# Patient Record
Sex: FEMALE | Race: OTHER | HISPANIC OR LATINO | URBAN - METROPOLITAN AREA
[De-identification: names, ages, dates, MRNs, and addresses within clinical notes are randomized per-mention and may not be internally consistent; named-entity substitution may affect disease eponyms.]

---

## 2017-10-01 ENCOUNTER — EMERGENCY (EMERGENCY)
Facility: HOSPITAL | Age: 77
LOS: 0 days | Discharge: HOME | End: 2017-10-01
Attending: EMERGENCY MEDICINE
Payer: MEDICARE

## 2017-10-01 VITALS
TEMPERATURE: 99 F | HEART RATE: 68 BPM | WEIGHT: 190.04 LBS | RESPIRATION RATE: 16 BRPM | DIASTOLIC BLOOD PRESSURE: 65 MMHG | SYSTOLIC BLOOD PRESSURE: 176 MMHG | HEIGHT: 65 IN | OXYGEN SATURATION: 96 %

## 2017-10-01 DIAGNOSIS — Z90.49 ACQUIRED ABSENCE OF OTHER SPECIFIED PARTS OF DIGESTIVE TRACT: Chronic | ICD-10-CM

## 2017-10-01 DIAGNOSIS — Z98.891 HISTORY OF UTERINE SCAR FROM PREVIOUS SURGERY: Chronic | ICD-10-CM

## 2017-10-01 DIAGNOSIS — E89.0 POSTPROCEDURAL HYPOTHYROIDISM: Chronic | ICD-10-CM

## 2017-10-01 LAB
APPEARANCE UR: ABNORMAL
BILIRUB UR-MCNC: NEGATIVE — SIGNIFICANT CHANGE UP
COLOR SPEC: YELLOW — SIGNIFICANT CHANGE UP
DIFF PNL FLD: ABNORMAL
GLUCOSE UR QL: NEGATIVE MG/DL — SIGNIFICANT CHANGE UP
KETONES UR-MCNC: NEGATIVE — SIGNIFICANT CHANGE UP
LEUKOCYTE ESTERASE UR-ACNC: ABNORMAL
NITRITE UR-MCNC: NEGATIVE — SIGNIFICANT CHANGE UP
PH UR: 8 — SIGNIFICANT CHANGE UP (ref 5–8)
PROT UR-MCNC: 30 MG/DL
SP GR SPEC: 1.01 — SIGNIFICANT CHANGE UP (ref 1.01–1.02)
UROBILINOGEN FLD QL: NEGATIVE MG/DL — SIGNIFICANT CHANGE UP

## 2017-10-01 PROCEDURE — 70450 CT HEAD/BRAIN W/O DYE: CPT | Mod: 26

## 2017-10-01 PROCEDURE — 99284 EMERGENCY DEPT VISIT MOD MDM: CPT

## 2017-10-01 RX ORDER — AZTREONAM 2 G
1 VIAL (EA) INJECTION
Qty: 6 | Refills: 0 | OUTPATIENT
Start: 2017-10-01 | End: 2017-10-04

## 2017-10-01 RX ADMIN — Medication 1 TABLET(S): at 23:37

## 2017-10-01 NOTE — ED ADULT NURSE NOTE - PMH
CKD (chronic kidney disease)    Dementia    DM (diabetes mellitus)    HLD (hyperlipidemia)    HTN (hypertension)

## 2017-10-01 NOTE — ED PROVIDER NOTE - PHYSICAL EXAMINATION
Gen: Alert, NAD  Head: NC, AT, PERRL, EOMI, normal lids/conjunctiva  ENT:  normal hearing, patent oropharynx without erythema/exudate, uvula midline  Neck: +supple, no tenderness/meningismus/JVD, +Trachea midline  Chest: no chest wall tenderness, equal chest rise  Pulm: Bilateral BS, normal resp effort, no wheeze/stridor/retractions  CV: RRR, no M/R/G, +dist pulses  Abd: soft, NT/ND, +BS, no hepatosplenomegaly  Rectal: deferred  Mskel: no edema/erythema/cyanosis  Skin: no rash  Neuro: AAOx2, no sensory/motor deficits, CN 2-12 intact

## 2017-10-01 NOTE — ED PROVIDER NOTE - PROGRESS NOTE DETAILS
Signed pt out to Dr. Lewis. Dispo pending CT head, if no acute findings - d/c home. Results reported to patient--UTI, CT negative  Pt. reports feeling better after meds  pt. agrees to f/u with primary care outpt.  pt. understands to return to ED if symptoms worsen; will d/c

## 2017-10-01 NOTE — ED ADULT NURSE NOTE - OBJECTIVE STATEMENT
Pt presented to the ed s/p fall. as per daughter pt hit the back of the head on the chair.  Denies any pain

## 2017-10-01 NOTE — ED PROVIDER NOTE - OBJECTIVE STATEMENT
Pertinent PMH/PSH/FHx/SHx and Review of Systems contained within:  76yo F pmh inclusive of IDDM, stage 4 CKD, htn, dementia, brought in for eval after fall. Pertinent PMH/PSH/FHx/SHx and Review of Systems contained within:  78yo F pmh inclusive of IDDM, stage 4 CKD, htn, dementia, brought in for eval after fall. Pt was at store with her daughter, tried sitting back onto walker/chair, brakes were not on, pt fell onto buttocks, back of head struck seat on her walker then daughter lowered pt back onto ground. Unk if LOC or if pt was "just closing her eyes for a few seconds". Per daughter, pt is back to her baseline mental status now. On ROS, does have foul smelling urine but denies dysuria or abd discomfort. No fever/chills, No photophobia/eye pain/changes in vision, No ear pain/sore throat/dysphagia, No chest pain/palpitations, no SOB/cough/wheeze/stridor, No abdominal pain, No N/V/D, no dysuria/frequency/discharge, No neck/back pain, no rash, no focal neuro symptoms.

## 2017-10-01 NOTE — ED PROVIDER NOTE - MEDICAL DECISION MAKING DETAILS
76yo F pmh as above, here for fall. H&P most consistent w/ mechanical fall. FS 140s. No UTI by UA. Given age and mechanism, CT to r/o bleed. If negative, dc home into daughter's care.

## 2017-10-02 RX ORDER — AZTREONAM 2 G
1 VIAL (EA) INJECTION
Qty: 6 | Refills: 0 | OUTPATIENT
Start: 2017-10-02 | End: 2017-10-05

## 2017-10-03 DIAGNOSIS — E78.4 OTHER HYPERLIPIDEMIA: ICD-10-CM

## 2017-10-03 DIAGNOSIS — N18.9 CHRONIC KIDNEY DISEASE, UNSPECIFIED: ICD-10-CM

## 2017-10-03 DIAGNOSIS — S09.90XA UNSPECIFIED INJURY OF HEAD, INITIAL ENCOUNTER: ICD-10-CM
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
## 2017-10-03 DIAGNOSIS — I10 ESSENTIAL (PRIMARY) HYPERTENSION: ICD-10-CM

## 2017-10-03 DIAGNOSIS — E11.9 TYPE 2 DIABETES MELLITUS WITHOUT COMPLICATIONS: ICD-10-CM

## 2017-10-03 DIAGNOSIS — E89.0 POSTPROCEDURAL HYPOTHYROIDISM: ICD-10-CM
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
## 2018-04-25 ENCOUNTER — NON-APPOINTMENT (OUTPATIENT)
Age: 78
End: 2018-04-25

## 2018-04-25 ENCOUNTER — APPOINTMENT (OUTPATIENT)
Dept: CARDIOLOGY | Facility: CLINIC | Age: 78
End: 2018-04-25
Payer: MEDICARE

## 2018-04-25 VITALS
WEIGHT: 203 LBS | HEIGHT: 59 IN | BODY MASS INDEX: 40.92 KG/M2 | OXYGEN SATURATION: 96 % | SYSTOLIC BLOOD PRESSURE: 167 MMHG | HEART RATE: 66 BPM | DIASTOLIC BLOOD PRESSURE: 63 MMHG

## 2018-04-25 DIAGNOSIS — I10 ESSENTIAL (PRIMARY) HYPERTENSION: ICD-10-CM

## 2018-04-25 PROCEDURE — 93000 ELECTROCARDIOGRAM COMPLETE: CPT

## 2018-04-25 PROCEDURE — 99205 OFFICE O/P NEW HI 60 MIN: CPT

## 2018-04-25 RX ORDER — LEVOTHYROXINE SODIUM 0.03 MG/1
25 TABLET ORAL DAILY
Qty: 90 | Refills: 3 | Status: ACTIVE | COMMUNITY
Start: 2018-02-27

## 2018-04-25 RX ORDER — MEMANTINE HYDROCHLORIDE AND DONEPEZIL HYDROCHLORIDE 28; 10 MG/1; MG/1
28-10 CAPSULE ORAL
Qty: 90 | Refills: 0 | Status: ACTIVE | COMMUNITY
Start: 2017-11-16

## 2018-04-25 RX ORDER — CARVEDILOL 12.5 MG/1
12.5 TABLET, FILM COATED ORAL TWICE DAILY
Qty: 30 | Refills: 0 | Status: ACTIVE | COMMUNITY
Start: 2017-11-14

## 2018-04-25 RX ORDER — AMLODIPINE BESYLATE 10 MG/1
10 TABLET ORAL
Qty: 30 | Refills: 3 | Status: ACTIVE | COMMUNITY

## 2018-04-25 RX ORDER — CITALOPRAM HYDROBROMIDE 20 MG/1
20 TABLET, FILM COATED ORAL
Refills: 0 | Status: ACTIVE | COMMUNITY
Start: 2017-11-22

## 2018-05-01 ENCOUNTER — APPOINTMENT (OUTPATIENT)
Dept: CV DIAGNOSITCS | Facility: HOSPITAL | Age: 78
End: 2018-05-01

## 2018-05-01 ENCOUNTER — OUTPATIENT (OUTPATIENT)
Dept: OUTPATIENT SERVICES | Facility: HOSPITAL | Age: 78
LOS: 1 days | End: 2018-05-01
Payer: COMMERCIAL

## 2018-05-01 DIAGNOSIS — I10 ESSENTIAL (PRIMARY) HYPERTENSION: ICD-10-CM

## 2018-05-01 DIAGNOSIS — E89.0 POSTPROCEDURAL HYPOTHYROIDISM: Chronic | ICD-10-CM

## 2018-05-01 DIAGNOSIS — Z90.49 ACQUIRED ABSENCE OF OTHER SPECIFIED PARTS OF DIGESTIVE TRACT: Chronic | ICD-10-CM

## 2018-05-01 DIAGNOSIS — Z98.891 HISTORY OF UTERINE SCAR FROM PREVIOUS SURGERY: Chronic | ICD-10-CM

## 2018-05-01 PROCEDURE — 93306 TTE W/DOPPLER COMPLETE: CPT

## 2018-05-01 PROCEDURE — 93306 TTE W/DOPPLER COMPLETE: CPT | Mod: 26

## 2018-05-15 ENCOUNTER — APPOINTMENT (OUTPATIENT)
Dept: CARDIOLOGY | Facility: CLINIC | Age: 78
End: 2018-05-15

## 2018-07-13 ENCOUNTER — INPATIENT (INPATIENT)
Facility: HOSPITAL | Age: 78
LOS: 5 days | Discharge: HOME | DRG: 247 | End: 2018-07-19
Attending: HOSPITALIST | Admitting: INTERNAL MEDICINE
Payer: MEDICARE

## 2018-07-13 VITALS
HEIGHT: 61 IN | DIASTOLIC BLOOD PRESSURE: 69 MMHG | WEIGHT: 199.96 LBS | TEMPERATURE: 98 F | OXYGEN SATURATION: 98 % | RESPIRATION RATE: 16 BRPM | HEART RATE: 57 BPM | SYSTOLIC BLOOD PRESSURE: 162 MMHG

## 2018-07-13 DIAGNOSIS — Z98.890 OTHER SPECIFIED POSTPROCEDURAL STATES: Chronic | ICD-10-CM

## 2018-07-13 DIAGNOSIS — I25.10 ATHEROSCLEROTIC HEART DISEASE OF NATIVE CORONARY ARTERY WITHOUT ANGINA PECTORIS: ICD-10-CM

## 2018-07-13 DIAGNOSIS — Z90.49 ACQUIRED ABSENCE OF OTHER SPECIFIED PARTS OF DIGESTIVE TRACT: Chronic | ICD-10-CM

## 2018-07-13 DIAGNOSIS — I10 ESSENTIAL (PRIMARY) HYPERTENSION: ICD-10-CM

## 2018-07-13 DIAGNOSIS — D64.9 ANEMIA, UNSPECIFIED: ICD-10-CM

## 2018-07-13 DIAGNOSIS — N18.4 CHRONIC KIDNEY DISEASE, STAGE 4 (SEVERE): ICD-10-CM

## 2018-07-13 DIAGNOSIS — Z98.891 HISTORY OF UTERINE SCAR FROM PREVIOUS SURGERY: Chronic | ICD-10-CM

## 2018-07-13 DIAGNOSIS — E89.0 POSTPROCEDURAL HYPOTHYROIDISM: Chronic | ICD-10-CM

## 2018-07-13 LAB
ALBUMIN SERPL ELPH-MCNC: 4.1 G/DL — SIGNIFICANT CHANGE UP (ref 3.3–5)
ALP SERPL-CCNC: 65 U/L — SIGNIFICANT CHANGE UP (ref 40–120)
ALT FLD-CCNC: 9 U/L — LOW (ref 10–45)
ANION GAP SERPL CALC-SCNC: 14 MMOL/L — SIGNIFICANT CHANGE UP (ref 5–17)
APPEARANCE UR: ABNORMAL
AST SERPL-CCNC: 16 U/L — SIGNIFICANT CHANGE UP (ref 10–40)
BACTERIA # UR AUTO: ABNORMAL /HPF
BILIRUB SERPL-MCNC: 0.2 MG/DL — SIGNIFICANT CHANGE UP (ref 0.2–1.2)
BILIRUB UR-MCNC: NEGATIVE — SIGNIFICANT CHANGE UP
BUN SERPL-MCNC: 39 MG/DL — HIGH (ref 7–23)
C DIFF GDH STL QL: NEGATIVE — SIGNIFICANT CHANGE UP
C DIFF GDH STL QL: SIGNIFICANT CHANGE UP
CALCIUM SERPL-MCNC: 8.9 MG/DL — SIGNIFICANT CHANGE UP (ref 8.4–10.5)
CHLORIDE SERPL-SCNC: 107 MMOL/L — SIGNIFICANT CHANGE UP (ref 96–108)
CO2 SERPL-SCNC: 24 MMOL/L — SIGNIFICANT CHANGE UP (ref 22–31)
COLOR SPEC: ABNORMAL
CREAT SERPL-MCNC: 1.97 MG/DL — HIGH (ref 0.5–1.3)
DIFF PNL FLD: ABNORMAL
EPI CELLS # UR: SIGNIFICANT CHANGE UP /HPF
GLUCOSE BLDC GLUCOMTR-MCNC: 104 MG/DL — HIGH (ref 70–99)
GLUCOSE BLDC GLUCOMTR-MCNC: 136 MG/DL — HIGH (ref 70–99)
GLUCOSE BLDC GLUCOMTR-MCNC: 181 MG/DL — HIGH (ref 70–99)
GLUCOSE SERPL-MCNC: 100 MG/DL — HIGH (ref 70–99)
GLUCOSE UR QL: NEGATIVE — SIGNIFICANT CHANGE UP
HCT VFR BLD CALC: 34.1 % — LOW (ref 34.5–45)
HGB BLD-MCNC: 10.9 G/DL — LOW (ref 11.5–15.5)
KETONES UR-MCNC: NEGATIVE — SIGNIFICANT CHANGE UP
LEUKOCYTE ESTERASE UR-ACNC: ABNORMAL
MCHC RBC-ENTMCNC: 30.4 PG — SIGNIFICANT CHANGE UP (ref 27–34)
MCHC RBC-ENTMCNC: 32.1 GM/DL — SIGNIFICANT CHANGE UP (ref 32–36)
MCV RBC AUTO: 94.7 FL — SIGNIFICANT CHANGE UP (ref 80–100)
NITRITE UR-MCNC: NEGATIVE — SIGNIFICANT CHANGE UP
PH UR: 8 — SIGNIFICANT CHANGE UP (ref 5–8)
PLATELET # BLD AUTO: 272 K/UL — SIGNIFICANT CHANGE UP (ref 150–400)
POTASSIUM SERPL-MCNC: 4.4 MMOL/L — SIGNIFICANT CHANGE UP (ref 3.5–5.3)
POTASSIUM SERPL-SCNC: 4.4 MMOL/L — SIGNIFICANT CHANGE UP (ref 3.5–5.3)
PROT SERPL-MCNC: 7.9 G/DL — SIGNIFICANT CHANGE UP (ref 6–8.3)
PROT UR-MCNC: 300 MG/DL
RBC # BLD: 3.6 M/UL — LOW (ref 3.8–5.2)
RBC # FLD: 13.9 % — SIGNIFICANT CHANGE UP (ref 10.3–14.5)
RBC CASTS # UR COMP ASSIST: >50 /HPF (ref 0–2)
SODIUM SERPL-SCNC: 145 MMOL/L — SIGNIFICANT CHANGE UP (ref 135–145)
SP GR SPEC: 1.02 — SIGNIFICANT CHANGE UP (ref 1.01–1.02)
TRI-PHOS CRY UR QL COMP ASSIST: ABNORMAL
UROBILINOGEN FLD QL: NEGATIVE — SIGNIFICANT CHANGE UP
WBC # BLD: 10 K/UL — SIGNIFICANT CHANGE UP (ref 3.8–10.5)
WBC # FLD AUTO: 10 K/UL — SIGNIFICANT CHANGE UP (ref 3.8–10.5)
WBC UR QL: ABNORMAL /HPF (ref 0–5)

## 2018-07-13 PROCEDURE — 99152 MOD SED SAME PHYS/QHP 5/>YRS: CPT

## 2018-07-13 PROCEDURE — 76937 US GUIDE VASCULAR ACCESS: CPT | Mod: 26

## 2018-07-13 PROCEDURE — 93458 L HRT ARTERY/VENTRICLE ANGIO: CPT | Mod: 26,XU

## 2018-07-13 PROCEDURE — 92928 PRQ TCAT PLMT NTRAC ST 1 LES: CPT | Mod: RC

## 2018-07-13 PROCEDURE — 93010 ELECTROCARDIOGRAM REPORT: CPT | Mod: 76

## 2018-07-13 PROCEDURE — 99223 1ST HOSP IP/OBS HIGH 75: CPT

## 2018-07-13 RX ORDER — ACETYLCYSTEINE 200 MG/ML
1200 VIAL (ML) MISCELLANEOUS EVERY 12 HOURS
Qty: 0 | Refills: 0 | Status: COMPLETED | OUTPATIENT
Start: 2018-07-13 | End: 2018-07-15

## 2018-07-13 RX ORDER — HYDRALAZINE HCL 50 MG
50 TABLET ORAL
Qty: 0 | Refills: 0 | Status: DISCONTINUED | OUTPATIENT
Start: 2018-07-13 | End: 2018-07-19

## 2018-07-13 RX ORDER — INSULIN LISPRO 100/ML
VIAL (ML) SUBCUTANEOUS
Qty: 0 | Refills: 0 | Status: DISCONTINUED | OUTPATIENT
Start: 2018-07-13 | End: 2018-07-19

## 2018-07-13 RX ORDER — ASPIRIN/CALCIUM CARB/MAGNESIUM 324 MG
1 TABLET ORAL
Qty: 0 | Refills: 0 | COMMUNITY

## 2018-07-13 RX ORDER — HEPARIN SODIUM 5000 [USP'U]/ML
5000 INJECTION INTRAVENOUS; SUBCUTANEOUS EVERY 8 HOURS
Qty: 0 | Refills: 0 | Status: DISCONTINUED | OUTPATIENT
Start: 2018-07-13 | End: 2018-07-13

## 2018-07-13 RX ORDER — HYDRALAZINE HCL 50 MG
1 TABLET ORAL
Qty: 0 | Refills: 0 | COMMUNITY

## 2018-07-13 RX ORDER — SODIUM CHLORIDE 9 MG/ML
1000 INJECTION INTRAMUSCULAR; INTRAVENOUS; SUBCUTANEOUS
Qty: 0 | Refills: 0 | Status: DISCONTINUED | OUTPATIENT
Start: 2018-07-13 | End: 2018-07-14

## 2018-07-13 RX ORDER — MEMANTINE HYDROCHLORIDE AND DONEPEZIL HYDROCHLORIDE 21; 10 MG/1; MG/1
1 CAPSULE ORAL
Qty: 0 | Refills: 0 | COMMUNITY

## 2018-07-13 RX ORDER — INSULIN GLARGINE 100 [IU]/ML
13 INJECTION, SOLUTION SUBCUTANEOUS
Qty: 0 | Refills: 0 | Status: DISCONTINUED | OUTPATIENT
Start: 2018-07-13 | End: 2018-07-19

## 2018-07-13 RX ORDER — DEXTROSE 50 % IN WATER 50 %
15 SYRINGE (ML) INTRAVENOUS ONCE
Qty: 0 | Refills: 0 | Status: DISCONTINUED | OUTPATIENT
Start: 2018-07-13 | End: 2018-07-19

## 2018-07-13 RX ORDER — INSULIN DETEMIR 100/ML (3)
16 INSULIN PEN (ML) SUBCUTANEOUS
Qty: 0 | Refills: 0 | COMMUNITY

## 2018-07-13 RX ORDER — DEXTROSE 50 % IN WATER 50 %
25 SYRINGE (ML) INTRAVENOUS ONCE
Qty: 0 | Refills: 0 | Status: DISCONTINUED | OUTPATIENT
Start: 2018-07-13 | End: 2018-07-19

## 2018-07-13 RX ORDER — CITALOPRAM 10 MG/1
20 TABLET, FILM COATED ORAL DAILY
Qty: 0 | Refills: 0 | Status: DISCONTINUED | OUTPATIENT
Start: 2018-07-13 | End: 2018-07-19

## 2018-07-13 RX ORDER — DONEPEZIL HYDROCHLORIDE 10 MG/1
10 TABLET, FILM COATED ORAL AT BEDTIME
Qty: 0 | Refills: 0 | Status: DISCONTINUED | OUTPATIENT
Start: 2018-07-13 | End: 2018-07-19

## 2018-07-13 RX ORDER — DEXTROSE 50 % IN WATER 50 %
12.5 SYRINGE (ML) INTRAVENOUS ONCE
Qty: 0 | Refills: 0 | Status: DISCONTINUED | OUTPATIENT
Start: 2018-07-13 | End: 2018-07-19

## 2018-07-13 RX ORDER — HYDRALAZINE HCL 50 MG
25 TABLET ORAL
Qty: 0 | Refills: 0 | Status: DISCONTINUED | OUTPATIENT
Start: 2018-07-13 | End: 2018-07-19

## 2018-07-13 RX ORDER — LEVOTHYROXINE SODIUM 125 MCG
25 TABLET ORAL DAILY
Qty: 0 | Refills: 0 | Status: DISCONTINUED | OUTPATIENT
Start: 2018-07-13 | End: 2018-07-19

## 2018-07-13 RX ORDER — INSULIN GLARGINE 100 [IU]/ML
12.8 INJECTION, SOLUTION SUBCUTANEOUS AT BEDTIME
Qty: 0 | Refills: 0 | Status: DISCONTINUED | OUTPATIENT
Start: 2018-07-13 | End: 2018-07-13

## 2018-07-13 RX ORDER — CITALOPRAM 10 MG/1
1 TABLET, FILM COATED ORAL
Qty: 0 | Refills: 0 | COMMUNITY

## 2018-07-13 RX ORDER — CLOPIDOGREL BISULFATE 75 MG/1
75 TABLET, FILM COATED ORAL DAILY
Qty: 0 | Refills: 0 | Status: DISCONTINUED | OUTPATIENT
Start: 2018-07-13 | End: 2018-07-19

## 2018-07-13 RX ORDER — INSULIN LISPRO 100/ML
VIAL (ML) SUBCUTANEOUS AT BEDTIME
Qty: 0 | Refills: 0 | Status: DISCONTINUED | OUTPATIENT
Start: 2018-07-13 | End: 2018-07-19

## 2018-07-13 RX ORDER — CARVEDILOL PHOSPHATE 80 MG/1
1 CAPSULE, EXTENDED RELEASE ORAL
Qty: 0 | Refills: 0 | COMMUNITY

## 2018-07-13 RX ORDER — ATORVASTATIN CALCIUM 80 MG/1
40 TABLET, FILM COATED ORAL AT BEDTIME
Qty: 0 | Refills: 0 | Status: DISCONTINUED | OUTPATIENT
Start: 2018-07-13 | End: 2018-07-19

## 2018-07-13 RX ORDER — MEMANTINE HYDROCHLORIDE 10 MG/1
28 TABLET ORAL DAILY
Qty: 0 | Refills: 0 | Status: DISCONTINUED | OUTPATIENT
Start: 2018-07-13 | End: 2018-07-13

## 2018-07-13 RX ORDER — INSULIN GLARGINE 100 [IU]/ML
13 INJECTION, SOLUTION SUBCUTANEOUS AT BEDTIME
Qty: 0 | Refills: 0 | Status: DISCONTINUED | OUTPATIENT
Start: 2018-07-13 | End: 2018-07-13

## 2018-07-13 RX ORDER — GLUCAGON INJECTION, SOLUTION 0.5 MG/.1ML
1 INJECTION, SOLUTION SUBCUTANEOUS ONCE
Qty: 0 | Refills: 0 | Status: DISCONTINUED | OUTPATIENT
Start: 2018-07-13 | End: 2018-07-19

## 2018-07-13 RX ORDER — LEVOTHYROXINE SODIUM 125 MCG
1 TABLET ORAL
Qty: 0 | Refills: 0 | COMMUNITY

## 2018-07-13 RX ORDER — AMLODIPINE BESYLATE 2.5 MG/1
10 TABLET ORAL DAILY
Qty: 0 | Refills: 0 | Status: DISCONTINUED | OUTPATIENT
Start: 2018-07-13 | End: 2018-07-19

## 2018-07-13 RX ORDER — ASPIRIN/CALCIUM CARB/MAGNESIUM 324 MG
81 TABLET ORAL DAILY
Qty: 0 | Refills: 0 | Status: DISCONTINUED | OUTPATIENT
Start: 2018-07-13 | End: 2018-07-19

## 2018-07-13 RX ORDER — CARVEDILOL PHOSPHATE 80 MG/1
12.5 CAPSULE, EXTENDED RELEASE ORAL EVERY 12 HOURS
Qty: 0 | Refills: 0 | Status: DISCONTINUED | OUTPATIENT
Start: 2018-07-13 | End: 2018-07-19

## 2018-07-13 RX ORDER — FENOFIBRATE,MICRONIZED 130 MG
48 CAPSULE ORAL AT BEDTIME
Qty: 0 | Refills: 0 | Status: DISCONTINUED | OUTPATIENT
Start: 2018-07-13 | End: 2018-07-19

## 2018-07-13 RX ORDER — MEMANTINE HYDROCHLORIDE 10 MG/1
10 TABLET ORAL
Qty: 0 | Refills: 0 | Status: DISCONTINUED | OUTPATIENT
Start: 2018-07-13 | End: 2018-07-19

## 2018-07-13 RX ORDER — AMLODIPINE BESYLATE 2.5 MG/1
1 TABLET ORAL
Qty: 0 | Refills: 0 | COMMUNITY

## 2018-07-13 RX ORDER — SODIUM CHLORIDE 9 MG/ML
1000 INJECTION, SOLUTION INTRAVENOUS
Qty: 0 | Refills: 0 | Status: DISCONTINUED | OUTPATIENT
Start: 2018-07-13 | End: 2018-07-19

## 2018-07-13 RX ORDER — FENOFIBRATE,MICRONIZED 130 MG
1 CAPSULE ORAL
Qty: 0 | Refills: 0 | COMMUNITY

## 2018-07-13 RX ADMIN — MEMANTINE HYDROCHLORIDE 10 MILLIGRAM(S): 10 TABLET ORAL at 22:41

## 2018-07-13 RX ADMIN — Medication 25 MILLIGRAM(S): at 17:59

## 2018-07-13 RX ADMIN — Medication 2: at 18:18

## 2018-07-13 RX ADMIN — INSULIN GLARGINE 13 UNIT(S): 100 INJECTION, SOLUTION SUBCUTANEOUS at 18:00

## 2018-07-13 RX ADMIN — Medication 1200 MILLIGRAM(S): at 11:31

## 2018-07-13 RX ADMIN — DONEPEZIL HYDROCHLORIDE 10 MILLIGRAM(S): 10 TABLET, FILM COATED ORAL at 22:41

## 2018-07-13 RX ADMIN — CARVEDILOL PHOSPHATE 12.5 MILLIGRAM(S): 80 CAPSULE, EXTENDED RELEASE ORAL at 17:59

## 2018-07-13 RX ADMIN — ATORVASTATIN CALCIUM 40 MILLIGRAM(S): 80 TABLET, FILM COATED ORAL at 22:41

## 2018-07-13 NOTE — CONSULT NOTE ADULT - SUBJECTIVE AND OBJECTIVE BOX
**********              CARDIOLOGY CONSULT NOTE              ************  ============================================================  CHIEF COMPLAINT/REASON FOR CONSULT:  Patient is a 78y old  Female who presents with a chief complaint of SOB    HISTORY OF PRESENT ILLNESS:  78yFemale with a history of Alz Dementia, Anemia, JOAO, HTN, HL, CKD, b/l Cr 2.5-2.8 (Current 1.9), CAD s/p prior PCI, Reported Echo with Moderate to Severe AS but recent echo in May with Mild AS and consistent with cath pressures - presenting with dyspnea and now s/p PCI awaiting staged PCI.   Not oriented but no reported CP, dyspnea, palps.   Renal following and leaving recs for AUBREY prevention.         ============================================================    ============================================================        Allergies    No Known Allergies    Intolerances    	    MEDICATIONS:  amLODIPine   Tablet 10 milliGRAM(s) Oral daily  aspirin enteric coated 81 milliGRAM(s) Oral daily  carvedilol 12.5 milliGRAM(s) Oral every 12 hours  clopidogrel Tablet 75 milliGRAM(s) Oral daily  hydrALAZINE 25 milliGRAM(s) Oral <User Schedule>  hydrALAZINE 50 milliGRAM(s) Oral <User Schedule>        citalopram 20 milliGRAM(s) Oral daily  donepezil 10 milliGRAM(s) Oral at bedtime  memantine 10 milliGRAM(s) Oral two times a day      atorvastatin 40 milliGRAM(s) Oral at bedtime  dextrose 40% Gel 15 Gram(s) Oral once PRN  dextrose 50% Injectable 12.5 Gram(s) IV Push once  dextrose 50% Injectable 25 Gram(s) IV Push once  dextrose 50% Injectable 25 Gram(s) IV Push once  fenofibrate Tablet 48 milliGRAM(s) Oral at bedtime  glucagon  Injectable 1 milliGRAM(s) IntraMuscular once PRN  insulin glargine Injectable (LANTUS) 13 Unit(s) SubCutaneous <User Schedule>  insulin lispro (HumaLOG) corrective regimen sliding scale   SubCutaneous three times a day before meals  insulin lispro (HumaLOG) corrective regimen sliding scale   SubCutaneous at bedtime  levothyroxine 25 MICROGram(s) Oral daily    dextrose 5%. 1000 milliLiter(s) IV Continuous <Continuous>  sodium chloride 0.9%. 1000 milliLiter(s) IV Continuous <Continuous>      PAST MEDICAL & SURGICAL HISTORY:  JOAO (obstructive sleep apnea)  Hypothyroidism, unspecified type  Primary osteoarthritis, unspecified site  Dementia  CKD (chronic kidney disease)  HLD (hyperlipidemia)  HTN (hypertension)  DM (diabetes mellitus)  Anemia  Alzheimer's dementia  CVA (cerebral vascular accident)  Diabetes mellitus type II  Coronary artery disease  Hyperlipemia  Hypertension  H/O cardiac catheterization  History of thyroidectomy  History of cholecystectomy  History of   H/O cataract extraction  H/O thyroidectomy: at age 18yo in [facility] ?due to mass?      FAMILY HISTORY:    Unable given baseline mental status     SOCIAL HISTORY:    [ x] Non-smoker  [x] No Illicit Drug Use  [ x] No Excess EtOH Use      REVIEW OF SYSTEMS: (Unless + Before Symptom, it is negative)  Constitutional: No Fever, Fatigue, Weight Changes  Eyes: No Recent Vision Changes, Eye Pain  ENT: No Congestion, Sore Throat  Endocrine: No Excess Sweating, Temperature Intolerance  Cardiovascular: No Chest Pain, Palpitations, +Shortness of Breath prior to coming , Pre-syncope, Syncope, LE Edema  Respiratory: No Cough, Congestion, Wheezing  Gastrointestinal: No Abdominal Pain, Nausea, Vomiting  Genitourinary: No dysuria, hematuria  Musculoskeletal: No Joint Pain, Swelling  Neurologic: No headaches, Imbalance, Weakness  Skin: No rashes, hematoma, purprura    ================================    PHYSICAL EXAM:  T(C): 36.6 (18 @ 12:45), Max: 36.6 (18 @ 12:45)  HR: 59 (18 @ 18:15) (56 - 63)  BP: 146/42 (18 @ 18:15) (98/52 - 170/53)  RR: 16 (18 @ 17:00) (16 - 17)  SpO2: 95% (18 @ 17:00) (94% - 98%)  Wt(kg): --  I&O's Summary    2018 07:01  -  2018 18:50  --------------------------------------------------------  IN: 0 mL / OUT: 300 mL / NET: -300 mL      Appearance: Normal; AOx1-2	  HEENT:   Normal oral mucosa, EOMI	  Lymphatic: No lymphadenopathy  Cardiovascular: Normal S1 S2, No JVD, No murmurs, No edema  Respiratory: Lungs clear to auscultation, no use of accessory muscles	  Psychiatry: A & O x 3, Mood & affect appropriate  Gastrointestinal:  Soft, Non-tender	  Skin: No rashes, No ecchymoses, No cyanosis	  Neurologic:AOx1 -2 ; no focal deficits  Extremities: Normal range of motion, No clubbing, cyanosis or edema  Vascular: Peripheral pulses palpable 2+ bilaterally, no prominent varicosities    ============================    LABS:	   Labs [id number] @ 18:50	    CBC Full  -  ( 2018 10:09 )  WBC Count : 10.0 K/uL  Hemoglobin : 10.9 g/dL  Hematocrit : 34.1 %  Platelet Count - Automated : 272 K/uL  Mean Cell Volume : 94.7 fl  Mean Cell Hemoglobin : 30.4 pg  Mean Cell Hemoglobin Concentration : 32.1 gm/dL  Auto Neutrophil # : x  Auto Lymphocyte # : x  Auto Monocyte # : x  Auto Eosinophil # : x  Auto Basophil # : x  Auto Neutrophil % : x  Auto Lymphocyte % : x  Auto Monocyte % : x  Auto Eosinophil % : x  Auto Basophil % : x        145  |  107  |  39<H>  ----------------------------<  100<H>  4.4   |  24  |  1.97<H>    Ca    8.9      2018 10:09    TPro  7.9  /  Alb  4.1  /  TBili  0.2  /  DBili  x   /  AST  16  /  ALT  9<L>  /  AlkPhos  65  07-13        =========================================================================  CXR:  Pending    ECG:  	  Pending     PREVIOUS DIAGNOSTIC TESTING:    [X] Echocardiogram:  F (Visual Estimate): 70-75 %  Doppler Peak Velocity (m/sec): AoV=2.2  ------------------------------------------------------------------------  Observations:  Mitral Valve: Mitral annular calcification.  Aortic Valve/Aorta: Aortic valve not well visualized;  appears calcified. Peak transaortic valve gradient equals  19 mm Hg, mean transaortic valve gradient equals 10 mm Hg,  aortic valve velocity time integral equals 57 cm,  consistent with mild aortic stenosis. Peak left ventricular  outflow tract gradient equals 5 mm Hg, mean gradient is  equal to 2 mm Hg, LVOT velocity time integral equals 34 cm.  Normal aortic root size. (Ao: 2.3 cm at the sinuses of  Valsalva).  Left Atrium: Mildly dilated left atrium.  LA volume index =  41 cc/m2.  Left Ventricle: Endocardium not well visualized; grossly  normal left ventricular systolic function. Moderate  concentric left ventricular hypertrophy.  Right Heart: Right atrium not well visualized. The right  ventricle is not well visualized; grossly normal right  ventricular systolic function. Tricuspid valve not well  visualized. Pulmonic valve not well visualized, probably  normal. Minimal pulmonic regurgitation.  Pericardium/Pleura: No pericardial effusion seen.  Hemodynamic: Estimated right atrial pressure is 8 mm Hg.  Inadequate tricuspid regurgitation Doppler envelope  precludes estimation of RVSP.  ------------------------------------------------------------------------  Conclusions:  1. Moderate concentric left ventricular hypertrophy.  2. Endocardium not well visualized; grossly normal left  ventricular systolic function.  3. The right ventricle is not well visualized; grossly  normal right ventricular systolic function.  *** No previous Echo exam. Technically difficult study.  Patient unable to consent to echo contrast.  ------------------------------------------------------------------------  Confirmed on  2018 - 13:13:43 by Lewis Mccormick M.D.    [X]  Catheterization:  -  Intervention on RPL1: drug-eluting stent.  TECHNIQUE: The risks and alternatives of the procedures and conscious  sedation were explained to the patient and informed consent was obtained.  Cardiac catheterization performed electively. Coronary intervention  performed electively.  Local anesthetic given. Right femoral artery access. Right femoral vein  access. Left heart catheterization. Left coronary artery angiography. The  vessel was injected utilizing a catheter. Right coronary artery  angiography. The vessel was injected utilizing a catheter. Sonosite -  Diagnostic. RADIATION EXPOSURE: 14.6 min. A successful drug-eluting stent  was performed on the 80 % lesion in the 1st right posterolateral segment.  Following intervention there was a 1 % residual stenosis. According to the  ACC/AHA classification system, this lesion was a type C lesion. There was  JESSICA 3 flow before the procedure and JESSICA 3 flow after the procedure.  There was no dissection. Vessel setup was performed. A 6FR JR4 LAUNCHER  guiding catheter was used to intubate the vessel. Vessel setup was  performed. A Listar UNIVERSAL 190CM wire was used to cross the lesion.  Balloon angioplasty was performed, using a 2.5 X 12 EUPHORA balloon, with  2 inflations and a maximum inflation pressure of 14 dung. A 2.50 X 15 JAVIER  drug-eluting stent was placed across the lesion and deployed at a maximum  inflation pressure of 18 dung. Sheath Exchange for Intervention.  CONTRAST GIVEN: Omnipaque 33 ml. Omnipaque 10 ml.  MEDICATIONS GIVEN: Fentanyl, 50 mcg, IV. Labetalol (Trandate), 10 mg, IV.  Heparin, 8000 units, IV.  CORONARY VESSELS: The coronary circulation is right dominant.  LM:   --  LM: Angiography showed minor luminal irregularities with no flow  limiting lesions.  LAD:   --  Proximal LAD: There was a 80 % stenosis.  CX:   --  Circumflex: Angiography showed minor luminal irregularities with  no flow limiting lesions.  RCA:   --  RPL1: There was a 80 % stenosis.  COMPLICATIONS: There were no complications.  DIAGNOSTIC RECOMMENDATIONS: ASA and Plavix for 1 year.  INTERVENTIONAL RECOMMENDATIONS: ASA and Plavix for 1 year.  Prepared and signed by  Samy Rutherford M.D.  Signed 2018 18:21:14  HEMODYNAMIC TABLES  Pressures:  Baseline  Pressures:  - HR: 62  Pressures:  - Rhythm:  Pressures:  -- Aortic Pressure (S/D/M): 194/56/107  Pressures:  -- Left Ventricle (s/edp): 188/32/--  Pressures:  -- Pulmonary Artery (S/D/M): 42/17/28  Pressures:  -- Pulmonary Capillary Wedge: 17/16/15  Pressures:  -- Right Atrium (a/v/M): 14/11/9  Pressures:  -- Right Ventricle (s/edp): 52/14/--  O2 Sats:  Baseline  O2 Sats:  - HR: 62  O2 Sats:  - Rhythm:  O2 Sats:  -- AO: 10/91.8/12.48  O2 Sats:  -- PA: 9.8/64.1/8.54  Outputs:  Baseline  Outputs:  -- CALCULATIONS[age]  Outputs:  -- CALCULATIONS: Body Surface Area: 1.89  Outputs:  -- CALCULATIONS: Height in cm: 155.00  Outputs:  -- CALCULATIONS: Sex: Female  Outputs:  -- CALCULATIONS: Weight in k.70  Outputs:  -- OUTPUTS: CO by Nicole: 6.43  Outputs:  -- OUTPUTS: Nicole cardiac index: 3.40  Outputs:  -- OUTPUTS: O2 consumption: 253.26  Outputs:  -- OUTPUTS: Vo2 Indexed: 134.04  Outputs:  -- RESISTANCES: Left ventricular stroke work: 125.62  Outputs:  -- RESISTANCES: Left Ventricular Stroke Work index: 66.48  Outputs:  -- RESISTANCES: Pulmonary vascular index (dsc): 305.75  Outputs:  -- RESISTANCES: Pulmonary vascular index (Wood Units): 3.82  Outputs:  -- RESISTANCES: Pulmonary vascular resistance (dsc): 161.82  Outputs:  -- RESISTANCES: Pulmonary vascular resistance (Wood Units): 2.02  Outputs:  -- RESISTANCES: PVR_SVR Ratio: 0.13  Outputs:  -- RESISTANCES: Right ventricular stroke work: 26.35  Outputs:  -- RESISTANCES: Right ventricular stroke work index: 13.95  Outputs:  -- RESISTANCES: Systemic vascular index (dsc): 2304.88  Outputs:  -- RESISTANCES: Systemic vascular index (Wood Units): 28.82  Outputs:  -- RESISTANCES: Systemic vascular resistance (dsc): 1219.85  Outputs:  -- RESISTANCES: Systemic vascular resistance (Wood Units): 15.25  Outputs:  -- RESISTANCES: Total pulmonary index (dsc): 658.54  Outputs:  -- RESISTANCES: Total pulmonary index (Wood Units): 8.23  Outputs:  -- RESISTANCES: Total pulmonary resistance (dsc): 348.53  Outputs:  -- RESISTANCES: Total pulmonary resistance (Wood Units): 4.36  Outputs:  -- RESISTANCES: Total vascular index (Wood Units): 31.46  Outputs:  -- RESISTANCES: Total vascular resistance (dsc): 1331.88  Outputs:  -- RESISTANCES: Total vascular resistance (Wood Units): 16.65  Outputs:  -- RESISTANCES: Total vascular resistance index (dsc): 2516.55  Outputs:  -- RESISTANCES: TPR_TVR Ratio: 0.26  Outputs:  -- SHUNTS: Pulmonary flow: 6.43  Outputs:  -- SHUNTS: Qp Indexed: 3.40  Outputs:  -- SHUNTS: Qs Indexed: 3.40    \    =========================================================================  ASSESSMENT:  78yFemale with a history of Alz Dementia, Anemia, JOAO, HTN, HL, CKD, b/l Cr 2.5-2.8 (Current 1.9), CAD s/p prior PCI, Reported Echo with Moderate to Severe AS but recent echo in May with Mild AS and consistent with cath pressures - presenting with dyspnea and now s/p PCI awaiting staged PCI.     Recommendations  - Continue DAPT   - Continue Amlodipine  - Continue Coreg  - Appreciate and follow renal recs re: AUBREY prevention  - Continue Atorva  - Plan for [facility] Monday   - Thank you for this consult.  - Will Follow      Please call with questions.                                       Total time spent in face-to-face encounter was 80 minutes. > 50 minutes spent in counseling and coordination of care addressing all medical issues listed above. All labs, imaging, consultant reports, and any relevant outside medical records personally reviewed in order to evaluate and manage the patient medically as well as provide coordination of care amongst providers.

## 2018-07-13 NOTE — CONSULT NOTE ADULT - SUBJECTIVE AND OBJECTIVE BOX
[facility] Consult Note Nephrology - CONSULTATION NOTE    79 y/o Czech speaking female PMH HTN, HLD, DM2 (A1C unknown, managed by PCP), CKD 4 baseline cr around 2.5 mg/dl CVA with residual mild right-sided weakness, anemia, and Alzheimer's dementia (oriented to person and place), JOAO no CPAP, admitted to [facility] (2018) with c/o dyspnea and admitting diagnosis of CHF (cardiac cath recommended at that time) and echo during that admission showing moderate to severe AS, presents for cardiac catheterization today. Pt seen and evaluated by Dr. Rutherford and referred for further evaluation.     Renal consult now called for CKD 4 with b/l cr around 2.5 to 2.8mg/dl now awaiting cardiac cath; Her cr is 1.9mg/dl currently. Has been off ace/arb and diuretics  currently feels okay  denies any f/c/n/v/d/cob    PAST MEDICAL & SURGICAL HISTORY:  JOAO (obstructive sleep apnea)  Hypothyroidism, unspecified type  Primary osteoarthritis, unspecified site  Dementia  CKD (chronic kidney disease)  HLD (hyperlipidemia)  HTN (hypertension)  DM (diabetes mellitus)  Anemia  Alzheimer's dementia  CVA (cerebral vascular accident)  Diabetes mellitus type II  Coronary artery disease  Hyperlipemia  Hypertension  H/O cardiac catheterization  History of thyroidectomy  History of cholecystectomy  History of   H/O cataract extraction  H/O thyroidectomy: at age 16yo in [facility] ?due to mass?    No Known Allergies    Home Medications Reviewed  Hospital Medications:   MEDICATIONS  (STANDING):  acetylcysteine  Oral Solution 1200 milliGRAM(s) Oral every 12 hours  sodium chloride 0.9%. 1000 milliLiter(s) (60 mL/Hr) IV Continuous <Continuous>    SOCIAL HISTORY:  Denies ETOh,Smoking,   FAMILY HISTORY:    REVIEW OF SYSTEMS:  CONSTITUTIONAL: No weakness, fevers or chills  EYES/ENT: No visual changes;  No vertigo or throat pain   NECK: No pain or stiffness  RESPIRATORY: No cough, wheezing, hemoptysis; No shortness of breath  CARDIOVASCULAR: No chest pain or palpitations.  GASTROINTESTINAL: No abdominal or epigastric pain. No nausea, vomiting, or hematemesis; No diarrhea or constipation. No melena or hematochezia.  GENITOURINARY: No dysuria, frequency, foamy urine, urinary urgency, incontinence or hematuria  NEUROLOGICAL: No numbness or weakness  SKIN: No itching, burning, rashes, or lesions   VASCULAR: No bilateral lower extremity edema.   All other review of systems is negative unless indicated above.    VITALS:  T(F): 97.7 (18 @ 09:46), Max: 97.7 (18 @ 09:46)  HR: 57 (18 @ 09:46)  BP: 162/69 (18 @ 09:46)  RR: 16 (18 @ 09:46)  SpO2: 98% (18 @ 09:46)  Wt(kg): --    Height (cm): 154.94 ( @ 10:57)  Weight (kg): 90.7 ( @ 10:57)  BMI (kg/m2): 37.8 (07-13 @ 10:57)  BSA (m2): 1.89 (07-13 @ 10:57)  PHYSICAL EXAM:  Constitutional: NAD  HEENT: anicteric sclera, oropharynx clear, MMM  Neck: No JVD  Respiratory: b/l rhonchi; + chest tube  Cardiovascular: S1, S2, RRR  Gastrointestinal: BS+, soft, NT/ND  Extremities: 2+ peripheral edema  Neurological: A/O x 3, no focal deficits  Psychiatric: Normal mood, normal affect  : No CVA tenderness. No little.   Skin: No rashes      LABS:      145  |  107  |  39<H>  ----------------------------<  100<H>  4.4   |  24  |  1.97<H>    Ca    8.9      2018 10:09    TPro  7.9  /  Alb  4.1  /  TBili  0.2  /  DBili      /  AST  16  /  ALT  9<L>  /  AlkPhos  65      Creatinine Trend: 1.97 <--                        10.9   10.0  )-----------( 272      ( 2018 10:09 )             34.1     Urine Studies:      RADIOLOGY & ADDITIONAL STUDIES:

## 2018-07-13 NOTE — CONSULT NOTE ADULT - PROBLEM SELECTOR RECOMMENDATION 9
cr currently is better then baseline at 1.9mg/dl  daughters by bedside- explained to daughters and pt about risks of AUBREY and needing potential dialysis- they understand and want to pursue cath  would check ua and urine lytes  start iv nacl @ 60cc/hr now and continue 12 hours post cath  start mucomyst 1200mg po bid x 4   avoid nephrotoxins  trend bmp

## 2018-07-13 NOTE — CHART NOTE - NSCHARTNOTEFT_GEN_A_CORE
Admit- patient underwent a PCI procedure and is being admitted due to high risk characteristicts and is considered to be at an increased risk of major adverse cardiovascular events if discharged at this time   - Unstable Angina  - CKD stage 4  - Age > 75 years  - need for staged procedure before discharge

## 2018-07-13 NOTE — H&P CARDIOLOGY - PSH
H/O cardiac catheterization    H/O cataract extraction    H/O thyroidectomy  at age 16yo in [facility] ?due to mass?  History of     History of cholecystectomy    History of thyroidectomy

## 2018-07-13 NOTE — CHART NOTE - NSCHARTNOTEFT_GEN_A_CORE
Removal of Femoral Sheath by Emanuel BORGES    Pulses in the (right and left) lower extremity are (palpable). The patient was placed in the supine position. The insertion site was identified and the sutures were removed per protocol.  The _ 6 and 7 ___ [place] femoral sheath was then removed. Direct pressure was applied for  __20____ minutes on each side.      Monitoring of the (right and left) groin and both lower extremities including neuro-vascular checks and vital signs every 15 minutes x 4, then every 30 minutes x 2, then every 1 hour was ordered.    Complications: None    Comments:

## 2018-07-13 NOTE — H&P CARDIOLOGY - HISTORY OF PRESENT ILLNESS
79 y/o  female PMH HTN, HLD, DM II, CRI, [place] [facility] [place]A with residual mild right-sided weakness, anemia and Alzheimer's dementia (A&O x 2) with C/P & SOB x 1 year, also awaiting anemia evaluation with endoscopy but had an abnormal clearance stress test and is now referred for C +/- PTCA/Stent.    Pt admits to mild, left-sided chest "pressure" without radiation occurring at rest and with exertion.  Pt admits to SOB after walking < 1/2 block.  Pt admits to occasional dizziness and recurrent syncope for > 20yrs.  Pt's daughter states that pt faints during emotional situations.  Pt had one syncopal episode in the past year.  Pt denies having a formal syncopal evaluation.  Pt admits to LE edema.  Pt denies palpitations, recent fever, diaphoresis or further symptoms.    Angina Class IV.  MEDS: Plavix, Imdur, Losartan, Lipitor, HCTZ, Fenofibrate.    Pt is A&O x2, lives with her daughter & uses a cane to ambulate. 77 y/o Pashto speaking female PMH HTN, HLD, DM2 (A1C unknown, managed by PCP), CKD 3 (nephrologist Dr. Joiner, Dr. Romo covering and notified), CVA with residual mild right-sided weakness, anemia, and Alzheimer's dementia (oriented to person and place), JOAO no CPAP, admitted to [facility] (March 2018) with c/o dyspnea and admitting diagnosis of CHF (cardiac cath recommended at that time) and echo during that admission showing moderate to severe AS, presents for cardiac catheterization today. Pt seen and evaluated by Dr. Rutherford and referred for further evaluation.   < from: Transthoracic Echocardiogram (05.01.18 @ 10:30) >  Observations:  Mitral Valve: Mitral annular calcification.  Aortic Valve/Aorta: Aortic valve not well visualized;  appears calcified. Peak transaortic valve gradient equals  19 mm Hg, mean transaortic valve gradient equals 10 mm Hg,  aortic valvevelocity time integral equals 57 cm,  consistent with mild aortic stenosis. Peak left ventricular  outflow tract gradient equals 5 mm Hg, mean gradient is  equal to 2 mm Hg, LVOT velocity time integral equals 34 cm.  Normal aortic root size. (Ao: 2.3cm at the sinuses of  Valsalva).  Left Atrium: Mildly dilated left atrium.  LA volume index =  41 cc/m2.  Left Ventricle: Endocardium not well visualized; grossly  normal left ventricular systolic function. Moderate  concentric left ventricular hypertrophy.  Right Heart: Right atrium not well visualized. The right  ventricle is not well visualized; grossly normal right  ventricular systolic function. Tricuspid valve not well  visualized. Pulmonic valve not well visualized, probably  normal. Minimal pulmonic regurgitation.  Pericardium/Pleura: No pericardial effusion seen.  Hemodynamic: Estimated right atrial pressure is 8 mm Hg.  Inadequate tricuspid regurgitation Doppler envelope  precludes estimation of RVSP.  ------------------------------------------------------------------------  Conclusions:  1. Moderate concentric left ventricular hypertrophy.  2. Endocardium not well visualized; grossly normal left  ventricular systolic function.  3. The right ventricle is not well visualized; grossly  normal right ventricular systolic function.    < end of copied text > 77 y/o Irish speaking female PMH HTN, HLD, DM2 (A1C unknown, managed by PCP), CKD 4 (nephrologist Dr. Joiner, Dr. Romo covering and notified), CVA with residual mild right-sided weakness, anemia, and Alzheimer's dementia (oriented to person and place), JOAO no CPAP, admitted to [facility] (March 2018) with c/o dyspnea and admitting diagnosis of CHF (cardiac cath recommended at that time) and echo during that admission showing moderate to severe AS, presents for cardiac catheterization today. Pt seen and evaluated by Dr. Rutherford and referred for further evaluation.   < from: Transthoracic Echocardiogram (05.01.18 @ 10:30) >  Observations:  Mitral Valve: Mitral annular calcification.  Aortic Valve/Aorta: Aortic valve not well visualized;  appears calcified. Peak transaortic valve gradient equals  19 mm Hg, mean transaortic valve gradient equals 10 mm Hg,  aortic valvevelocity time integral equals 57 cm,  consistent with mild aortic stenosis. Peak left ventricular  outflow tract gradient equals 5 mm Hg, mean gradient is  equal to 2 mm Hg, LVOT velocity time integral equals 34 cm.  Normal aortic root size. (Ao: 2.3cm at the sinuses of  Valsalva).  Left Atrium: Mildly dilated left atrium.  LA volume index =  41 cc/m2.  Left Ventricle: Endocardium not well visualized; grossly  normal left ventricular systolic function. Moderate  concentric left ventricular hypertrophy.  Right Heart: Right atrium not well visualized. The right  ventricle is not well visualized; grossly normal right  ventricular systolic function. Tricuspid valve not well  visualized. Pulmonic valve not well visualized, probably  normal. Minimal pulmonic regurgitation.  Pericardium/Pleura: No pericardial effusion seen.  Hemodynamic: Estimated right atrial pressure is 8 mm Hg.  Inadequate tricuspid regurgitation Doppler envelope  precludes estimation of RVSP.  ------------------------------------------------------------------------  Conclusions:  1. Moderate concentric left ventricular hypertrophy.  2. Endocardium not well visualized; grossly normal left  ventricular systolic function.  3. The right ventricle is not well visualized; grossly  normal right ventricular systolic function.    < end of copied text >

## 2018-07-13 NOTE — CHART NOTE - NSCHARTNOTEFT_GEN_A_CORE
Patient is a 78y old  Female who presents with a chief complaint of     HPI:  78yy/o Colombian speaking female PMH HTN, HLD, DM2 (A1C unknown, managed by PCP), CKD 4 (nephrologist Dr. Joiner, Dr. Romo covering and notified), CVA with residual mild right-sided weakness, anemia, and Alzheimer's dementia (oriented to person and place), JOAO no CPAP, admitted to [facility] (2018) with c/o dyspnea and admitting diagnosis of CHF (cardiac cath recommended at that time) and echo during that admission showing moderate to severe AS, presents for cardiac catheterization today. Pt seen and evaluated by Dr. Rutherford and referred for further evaluation.     Patient is now s/p elective cath with stent to RCA, pending staged cath to LAD on Monday. Patient seen and examined at the bedside, with daughter at bedside. Patient currently has no complaints, and has no chest pain or shortness of breath.     PMHx/PSHx:   PAST MEDICAL & SURGICAL HISTORY:  JOAO (obstructive sleep apnea)  Hypothyroidism, unspecified type  Primary osteoarthritis, unspecified site  Dementia  CKD (chronic kidney disease)  HLD (hyperlipidemia)  HTN (hypertension)  DM (diabetes mellitus)  Anemia  Alzheimer's dementia  CVA (cerebral vascular accident)  Diabetes mellitus type II  Coronary artery disease  Hyperlipemia  Hypertension  H/O cardiac catheterization  History of thyroidectomy  History of cholecystectomy  History of   H/O cataract extraction  [facility] 18[facility]      Allergies: Allergies    No Known Allergies    Intolerances        Medications Standing   MEDICATIONS  (STANDING):  acetylcysteine  Oral Solution 1200 milliGRAM(s) Oral every 12 hours  amLODIPine   Tablet 10 milliGRAM(s) Oral daily  aspirin enteric coated 81 milliGRAM(s) Oral daily  atorvastatin 40 milliGRAM(s) Oral at bedtime  carvedilol 12.5 milliGRAM(s) Oral every 12 hours  citalopram 20 milliGRAM(s) Oral daily  clopidogrel Tablet 75 milliGRAM(s) Oral daily  dextrose 5%. 1000 milliLiter(s) (50 mL/Hr) IV Continuous <Continuous>  dextrose 50% Injectable 12.5 Gram(s) IV Push once  dextrose 50% Injectable 25 Gram(s) IV Push once  dextrose 50% Injectable 25 Gram(s) IV Push once  donepezil 10 milliGRAM(s) Oral at bedtime  fenofibrate Tablet 48 milliGRAM(s) Oral at bedtime  hydrALAZINE 25 milliGRAM(s) Oral <User Schedule>  hydrALAZINE 50 milliGRAM(s) Oral <User Schedule>  insulin glargine Injectable (LANTUS) 13 Unit(s) SubCutaneous at bedtime  insulin lispro (HumaLOG) corrective regimen sliding scale   SubCutaneous three times a day before meals  insulin lispro (HumaLOG) corrective regimen sliding scale   SubCutaneous at bedtime  levothyroxine 25 MICROGram(s) Oral daily  memantine 10 milliGRAM(s) Oral two times a day  sodium chloride 0.9%. 1000 milliLiter(s) (60 mL/Hr) IV Continuous <Continuous>      Medications PRN   MEDICATIONS  (PRN):  dextrose 40% Gel 15 Gram(s) Oral once PRN Blood Glucose LESS THAN 70 milliGRAM(s)/deciliter  glucagon  Injectable 1 milliGRAM(s) IntraMuscular once PRN Glucose LESS THAN 70 milligrams/deciliter      Physical Exam:   Constitutional: NAD, resting comfortably in bed   HEENT: anicteric sclera, oropharynx clear, MMM  Neck: No JVD  Respiratory: CTA B/l  Cardiovascular: S1, S2, RRR  Gastrointestinal: BS+, soft, NT/ND, groin site without hematoma   Neurological: A/O x 3, no focal deficits  Psychiatric: Calm    LABS   CBC                       10.9   10.0  )-----------( 272      ( 2018 10:09 )             34.1     CMP 07-13    145  |  107  |  39<H>  ----------------------------<  100<H>  4.4   |  24  |  1.97<H>    Ca    8.9      2018 10:09    TPro  7.9  /  Alb  4.1  /  TBili  0.2  /  DBili  x   /  AST  16  /  ALT  9<L>  /  AlkPhos  65  07-13        Radiology:     < from: Transthoracic Echocardiogram (18 @ 10:30) >      Patient name: MARTINEZ TREJO  YOB: 1940   Age: 77 (F)   MR#: [id number]  Study Date: 2018  Location: O/PSonographer: Martha Trujillo RDCS  Study quality: Technically difficult  Referring Physician: LISE RUTHERFORD MD  Blood Pressure: 180/57 mmHg  Height: 150 cm  Weight: 91 kg  BSA: 1.8 m2  ------------------------------------------------------------------------  PROCEDURE: Transthoracic echocardiogram with 2-D, M-Mode  and complete spectral and color flow Doppler.  INDICATION: Essential (primary) hypertension (I10)  ------------------------------------------------------------------------  Dimensions:    Normal Values:  LA:     4.4    2.0 - 4.0 cm  Ao:     2.3    2.0 - 3.8 cm  SEPTUM: 1.4    0.6 - 1.2 cm  PWT:    1.3    0.6 - 1.1 cm  LVIDd:  5.0    3.0 - 5.6 cm  LVIDs:  3.5    1.8 - 4.0 cm  Derived variables:  LVMI: 150 g/m2  RWT: 0.52  EF (Visual Estimate): 70-75 %  Doppler Peak Velocity (m/sec): AoV=2.2  ------------------------------------------------------------------------  Observations:  Mitral Valve: Mitral annular calcification.  Aortic Valve/Aorta: Aortic valve not well visualized;  appears calcified. Peak transaortic valve gradient equals  19 mm Hg, mean transaortic valve gradient equals 10 mm Hg,  aortic valvevelocity time integral equals 57 cm,  consistent with mild aortic stenosis. Peak left ventricular  outflow tract gradient equals 5 mm Hg, mean gradient is  equal to 2 mm Hg, LVOT velocity time integral equals 34 cm.  Normal aortic root size. (Ao: 2.3cm at the sinuses of  Valsalva).  Left Atrium: Mildly dilated left atrium.  LA volume index =  41 cc/m2.  Left Ventricle: Endocardium not well visualized; grossly  normal left ventricular systolic function. Moderate  concentric left ventricular hypertrophy.  Right Heart: Right atrium not well visualized. The right  ventricle is not well visualized; grossly normal right  ventricular systolic function. Tricuspid valve not well  visualized. Pulmonic valve not well visualized, probably  normal. Minimal pulmonic regurgitation.  Pericardium/Pleura: No pericardial effusion seen.  Hemodynamic: Estimated right atrial pressure is 8 mm Hg.  Inadequate tricuspid regurgitation Doppler envelope  precludes estimation of RVSP.  ------------------------------------------------------------------------  Conclusions:  1. Moderate concentric left ventricular hypertrophy.  2. Endocardium not well visualized; grossly normal left  ventricular systolic function.  3. The right ventricle is not well visualized; grossly  normal right ventricular systolic function.  *** No previous Echo exam. Technically difficult study.  Patient unable to consent to echo contrast.  ------------------------------------------------------------------------  Confirmed on  2018 -13:13:43 by Lewis Mccormick M.D.  ------------------------------------------------------------------------    < end of copied text >      For followup:  - Patient to be admitted to medicine service for staged cath to LAD on Monday  - Follow up nephrology recommendations regarding CKD stage 4. Follow up UA and urine lytes  - Start NS at 60cc/hour and continue 12 hours post cath   -start mucomyst 1200mg po bid x 4   - Trend BMP     Deisy Rooney MD, PGY-3  MAR [id number] Patient is a 78y old  Female who presents with a chief complaint of     HPI:  78yy/o Norwegian speaking female PMH HTN, HLD, DM2 (A1C unknown, managed by PCP), CKD 4 (nephrologist Dr. Joiner, Dr. Romo covering and notified), CVA with residual mild right-sided weakness, anemia, and Alzheimer's dementia (oriented to person and place), JOAO no CPAP, admitted to [facility] (2018) with c/o dyspnea and admitting diagnosis of CHF (cardiac cath recommended at that time) and echo during that admission showing moderate to severe AS, recommended for cardiac cath as part of workup for valvular disease presents for planned cardiac catheterization today. Pt seen and evaluated by Dr. Rutherford and referred for further evaluation.     Patient is now s/p elective cath with stent to RCA, pending staged cath to LAD on Monday. Patient seen and examined at the bedside, with daughter at bedside. Patient currently has no complaints, and has no chest pain or shortness of breath. Daughter reports pt has had intermittent excruciating R groin pain, worst when it is cold x several months. Was told to see pain management specialist. At present has no groin pain.     PMHx/PSHx:   PAST MEDICAL & SURGICAL HISTORY:  JOAO (obstructive sleep apnea)  Hypothyroidism, unspecified type  Primary osteoarthritis, unspecified site  Dementia  CKD (chronic kidney disease)  HLD (hyperlipidemia)  HTN (hypertension)  DM (diabetes mellitus)  Anemia  Alzheimer's dementia  CVA (cerebral vascular accident)  Diabetes mellitus type II  Coronary artery disease  Hyperlipemia  Hypertension  H/O cardiac catheterization  History of thyroidectomy  History of cholecystectomy  History of   H/O cataract extraction  H/O thyroidectomy: at age 18yo in [facility] ?due to mass?      Allergies: Allergies    No Known Allergies    Family hx: DM, HTN in both mother and father  Social: nonsmoker, no alcohol, no drugs, lives at home with 24/7 live in aid    Medications Standing   MEDICATIONS  (STANDING):  acetylcysteine  Oral Solution 1200 milliGRAM(s) Oral every 12 hours  amLODIPine   Tablet 10 milliGRAM(s) Oral daily  aspirin enteric coated 81 milliGRAM(s) Oral daily  atorvastatin 40 milliGRAM(s) Oral at bedtime  carvedilol 12.5 milliGRAM(s) Oral every 12 hours  citalopram 20 milliGRAM(s) Oral daily  clopidogrel Tablet 75 milliGRAM(s) Oral daily  dextrose 5%. 1000 milliLiter(s) (50 mL/Hr) IV Continuous <Continuous>  dextrose 50% Injectable 12.5 Gram(s) IV Push once  dextrose 50% Injectable 25 Gram(s) IV Push once  dextrose 50% Injectable 25 Gram(s) IV Push once  donepezil 10 milliGRAM(s) Oral at bedtime  fenofibrate Tablet 48 milliGRAM(s) Oral at bedtime  hydrALAZINE 25 milliGRAM(s) Oral <User Schedule>  hydrALAZINE 50 milliGRAM(s) Oral <User Schedule>  insulin glargine Injectable (LANTUS) 13 Unit(s) SubCutaneous at bedtime  insulin lispro (HumaLOG) corrective regimen sliding scale   SubCutaneous three times a day before meals  insulin lispro (HumaLOG) corrective regimen sliding scale   SubCutaneous at bedtime  levothyroxine 25 MICROGram(s) Oral daily  memantine 10 milliGRAM(s) Oral two times a day  sodium chloride 0.9%. 1000 milliLiter(s) (60 mL/Hr) IV Continuous <Continuous>      Medications PRN   MEDICATIONS  (PRN):  dextrose 40% Gel 15 Gram(s) Oral once PRN Blood Glucose LESS THAN 70 milliGRAM(s)/deciliter  glucagon  Injectable 1 milliGRAM(s) IntraMuscular once PRN Glucose LESS THAN 70 milligrams/deciliter      Physical Exam:   Constitutional: NAD, resting comfortably in bed   HEENT: anicteric sclera, oropharynx clear, MMM  Neck: No JVD  Respiratory: CTA B/l  Cardiovascular: S1, S2, RRR  Gastrointestinal: BS+, soft, NT/ND, groin site without hematoma   Neurological: A/O x 3, no focal deficits  Psychiatric: Calm    LABS   CBC                       10.9   10.0  )-----------( 272      ( 2018 10:09 )             34.1     CMP 07-13    145  |  107  |  39<H>  ----------------------------<  100<H>  4.4   |  24  |  1.97<H>    Ca    8.9      2018 10:09    TPro  7.9  /  Alb  4.1  /  TBili  0.2  /  DBili  x   /  AST  16  /  ALT  9<L>  /  AlkPhos  65  07-        Radiology:     < from: Transthoracic Echocardiogram (18 @ 10:30) >      Patient name: MARTINEZ TREJO  YOB: 1940   Age: 77 (F)   MR#: [id number]  Study Date: 2018  Location: O/PSonographer: Martha Trujillo KAYLEY  Study quality: Technically difficult  Referring Physician: LISE RUTHERFORD MD  Blood Pressure: 180/57 mmHg  Height: 150 cm  Weight: 91 kg  BSA: 1.8 m2  ------------------------------------------------------------------------  PROCEDURE: Transthoracic echocardiogram with 2-D, M-Mode  and complete spectral and color flow Doppler.  INDICATION: Essential (primary) hypertension (I10)  ------------------------------------------------------------------------  Dimensions:    Normal Values:  LA:     4.4    2.0 - 4.0 cm  Ao:     2.3    2.0 - 3.8 cm  SEPTUM: 1.4    0.6 - 1.2 cm  PWT:    1.3    0.6 - 1.1 cm  LVIDd:  5.0    3.0 - 5.6 cm  LVIDs:  3.5    1.8 - 4.0 cm  Derived variables:  LVMI: 150 g/m2  RWT: 0.52  EF (Visual Estimate): 70-75 %  Doppler Peak Velocity (m/sec): AoV=2.2  ------------------------------------------------------------------------  Observations:  Mitral Valve: Mitral annular calcification.  Aortic Valve/Aorta: Aortic valve not well visualized;  appears calcified. Peak transaortic valve gradient equals  19 mm Hg, mean transaortic valve gradient equals 10 mm Hg,  aortic valvevelocity time integral equals 57 cm,  consistent with mild aortic stenosis. Peak left ventricular  outflow tract gradient equals 5 mm Hg, mean gradient is  equal to 2 mm Hg, LVOT velocity time integral equals 34 cm.  Normal aortic root size. (Ao: 2.3cm at the sinuses of  Valsalva).  Left Atrium: Mildly dilated left atrium.  LA volume index =  41 cc/m2.  Left Ventricle: Endocardium not well visualized; grossly  normal left ventricular systolic function. Moderate  concentric left ventricular hypertrophy.  Right Heart: Right atrium not well visualized. The right  ventricle is not well visualized; grossly normal right  ventricular systolic function. Tricuspid valve not well  visualized. Pulmonic valve not well visualized, probably  normal. Minimal pulmonic regurgitation.  Pericardium/Pleura: No pericardial effusion seen.  Hemodynamic: Estimated right atrial pressure is 8 mm Hg.  Inadequate tricuspid regurgitation Doppler envelope  precludes estimation of RVSP.  ------------------------------------------------------------------------  Conclusions:  1. Moderate concentric left ventricular hypertrophy.  2. Endocardium not well visualized; grossly normal left  ventricular systolic function.  3. The right ventricle is not well visualized; grossly  normal right ventricular systolic function.  *** No previous Echo exam. Technically difficult study.  Patient unable to consent to echo contrast.  ------------------------------------------------------------------------  Confirmed on  2018 -13:13:43 by Lewis Mccormick M.D.  ------------------------------------------------------------------------    < end of copied text >      For followup:  - Patient to be admitted to medicine service for staged cath to LAD on Monday  - Follow up nephrology recommendations regarding CKD stage 4. Follow up UA and urine lytes  - Start NS at 60cc/hour and continue 12 hours post cath   -start mucomyst 1200mg po bid x 4   - Trend BMP     Deisy Rooney MD, PGY-3  MAR [id number]

## 2018-07-13 NOTE — H&P CARDIOLOGY - PMH
Alzheimer's dementia    Anemia    CKD (chronic kidney disease)    Coronary artery disease    CVA (cerebral vascular accident)    Dementia    Diabetes mellitus type II    DM (diabetes mellitus)    HLD (hyperlipidemia)    HTN (hypertension)    Hyperlipemia    Hypertension    Hypothyroidism, unspecified type    JOAO (obstructive sleep apnea)    Primary osteoarthritis, unspecified site

## 2018-07-13 NOTE — CHART NOTE - NSCHARTNOTEFT_GEN_A_CORE
Admit- patient underwent a PCI procedure and is being admitted due to high risk characteristics and is considered to be at an increased risk of major adverse cardiovascular events if discharged at this time   - unstable angina   - needing for staged intervention while hospitalized

## 2018-07-14 DIAGNOSIS — E03.9 HYPOTHYROIDISM, UNSPECIFIED: ICD-10-CM

## 2018-07-14 DIAGNOSIS — I25.10 ATHEROSCLEROTIC HEART DISEASE OF NATIVE CORONARY ARTERY WITHOUT ANGINA PECTORIS: ICD-10-CM

## 2018-07-14 DIAGNOSIS — E11.22 TYPE 2 DIABETES MELLITUS WITH DIABETIC CHRONIC KIDNEY DISEASE: ICD-10-CM

## 2018-07-14 DIAGNOSIS — Z29.9 ENCOUNTER FOR PROPHYLACTIC MEASURES, UNSPECIFIED: ICD-10-CM

## 2018-07-14 DIAGNOSIS — R82.71 BACTERIURIA: ICD-10-CM

## 2018-07-14 DIAGNOSIS — I63.9 CEREBRAL INFARCTION, UNSPECIFIED: ICD-10-CM

## 2018-07-14 DIAGNOSIS — N18.4 CHRONIC KIDNEY DISEASE, STAGE 4 (SEVERE): ICD-10-CM

## 2018-07-14 DIAGNOSIS — R19.5 OTHER FECAL ABNORMALITIES: ICD-10-CM

## 2018-07-14 DIAGNOSIS — G30.9 ALZHEIMER'S DISEASE, UNSPECIFIED: ICD-10-CM

## 2018-07-14 LAB
ALBUMIN SERPL ELPH-MCNC: 3.3 G/DL — SIGNIFICANT CHANGE UP (ref 3.3–5)
ALP SERPL-CCNC: 63 U/L — SIGNIFICANT CHANGE UP (ref 40–120)
ALT FLD-CCNC: 8 U/L — LOW (ref 10–45)
ANION GAP SERPL CALC-SCNC: 13 MMOL/L — SIGNIFICANT CHANGE UP (ref 5–17)
AST SERPL-CCNC: 17 U/L — SIGNIFICANT CHANGE UP (ref 10–40)
BILIRUB SERPL-MCNC: 0.2 MG/DL — SIGNIFICANT CHANGE UP (ref 0.2–1.2)
BUN SERPL-MCNC: 30 MG/DL — HIGH (ref 7–23)
CALCIUM SERPL-MCNC: 8.4 MG/DL — SIGNIFICANT CHANGE UP (ref 8.4–10.5)
CHLORIDE SERPL-SCNC: 108 MMOL/L — SIGNIFICANT CHANGE UP (ref 96–108)
CO2 SERPL-SCNC: 22 MMOL/L — SIGNIFICANT CHANGE UP (ref 22–31)
CREAT SERPL-MCNC: 1.85 MG/DL — HIGH (ref 0.5–1.3)
FERRITIN SERPL-MCNC: 88 NG/ML — SIGNIFICANT CHANGE UP (ref 15–150)
GLUCOSE BLDC GLUCOMTR-MCNC: 140 MG/DL — HIGH (ref 70–99)
GLUCOSE BLDC GLUCOMTR-MCNC: 160 MG/DL — HIGH (ref 70–99)
GLUCOSE BLDC GLUCOMTR-MCNC: 94 MG/DL — SIGNIFICANT CHANGE UP (ref 70–99)
GLUCOSE BLDC GLUCOMTR-MCNC: 98 MG/DL — SIGNIFICANT CHANGE UP (ref 70–99)
GLUCOSE SERPL-MCNC: 185 MG/DL — HIGH (ref 70–99)
HBA1C BLD-MCNC: 6.4 % — HIGH (ref 4–5.6)
IRON SATN MFR SERPL: 17 % — SIGNIFICANT CHANGE UP (ref 14–50)
IRON SATN MFR SERPL: 45 UG/DL — SIGNIFICANT CHANGE UP (ref 30–160)
POTASSIUM SERPL-MCNC: 3.9 MMOL/L — SIGNIFICANT CHANGE UP (ref 3.5–5.3)
POTASSIUM SERPL-SCNC: 3.9 MMOL/L — SIGNIFICANT CHANGE UP (ref 3.5–5.3)
PROT SERPL-MCNC: 7.1 G/DL — SIGNIFICANT CHANGE UP (ref 6–8.3)
SODIUM SERPL-SCNC: 143 MMOL/L — SIGNIFICANT CHANGE UP (ref 135–145)
TIBC SERPL-MCNC: 265 UG/DL — SIGNIFICANT CHANGE UP (ref 220–430)
UIBC SERPL-MCNC: 220 UG/DL — SIGNIFICANT CHANGE UP (ref 110–370)

## 2018-07-14 PROCEDURE — 93010 ELECTROCARDIOGRAM REPORT: CPT

## 2018-07-14 PROCEDURE — 99233 SBSQ HOSP IP/OBS HIGH 50: CPT

## 2018-07-14 RX ORDER — HEPARIN SODIUM 5000 [USP'U]/ML
5000 INJECTION INTRAVENOUS; SUBCUTANEOUS EVERY 12 HOURS
Qty: 0 | Refills: 0 | Status: DISCONTINUED | OUTPATIENT
Start: 2018-07-14 | End: 2018-07-19

## 2018-07-14 RX ADMIN — CLOPIDOGREL BISULFATE 75 MILLIGRAM(S): 75 TABLET, FILM COATED ORAL at 13:06

## 2018-07-14 RX ADMIN — Medication 50 MILLIGRAM(S): at 00:35

## 2018-07-14 RX ADMIN — CITALOPRAM 20 MILLIGRAM(S): 10 TABLET, FILM COATED ORAL at 13:06

## 2018-07-14 RX ADMIN — CARVEDILOL PHOSPHATE 12.5 MILLIGRAM(S): 80 CAPSULE, EXTENDED RELEASE ORAL at 17:04

## 2018-07-14 RX ADMIN — Medication 25 MILLIGRAM(S): at 13:06

## 2018-07-14 RX ADMIN — MEMANTINE HYDROCHLORIDE 10 MILLIGRAM(S): 10 TABLET ORAL at 05:24

## 2018-07-14 RX ADMIN — Medication 50 MILLIGRAM(S): at 05:24

## 2018-07-14 RX ADMIN — Medication 1200 MILLIGRAM(S): at 17:04

## 2018-07-14 RX ADMIN — MEMANTINE HYDROCHLORIDE 10 MILLIGRAM(S): 10 TABLET ORAL at 21:43

## 2018-07-14 RX ADMIN — HEPARIN SODIUM 5000 UNIT(S): 5000 INJECTION INTRAVENOUS; SUBCUTANEOUS at 17:22

## 2018-07-14 RX ADMIN — Medication 50 MILLIGRAM(S): at 21:43

## 2018-07-14 RX ADMIN — INSULIN GLARGINE 13 UNIT(S): 100 INJECTION, SOLUTION SUBCUTANEOUS at 14:57

## 2018-07-14 RX ADMIN — Medication 2: at 17:04

## 2018-07-14 RX ADMIN — DONEPEZIL HYDROCHLORIDE 10 MILLIGRAM(S): 10 TABLET, FILM COATED ORAL at 21:43

## 2018-07-14 RX ADMIN — CARVEDILOL PHOSPHATE 12.5 MILLIGRAM(S): 80 CAPSULE, EXTENDED RELEASE ORAL at 05:24

## 2018-07-14 RX ADMIN — ATORVASTATIN CALCIUM 40 MILLIGRAM(S): 80 TABLET, FILM COATED ORAL at 21:43

## 2018-07-14 RX ADMIN — Medication 48 MILLIGRAM(S): at 21:43

## 2018-07-14 RX ADMIN — AMLODIPINE BESYLATE 10 MILLIGRAM(S): 2.5 TABLET ORAL at 05:24

## 2018-07-14 RX ADMIN — Medication 1200 MILLIGRAM(S): at 05:24

## 2018-07-14 RX ADMIN — Medication 81 MILLIGRAM(S): at 13:06

## 2018-07-14 RX ADMIN — Medication 25 MICROGRAM(S): at 05:24

## 2018-07-14 NOTE — PROGRESS NOTE ADULT - PROBLEM SELECTOR PLAN 7
UA w/ blood and bacteria and UCX positive. However, patient with no new confusion, no fever, leukocytosis, no dysuria, polyuria, or incontinence. Daughter reports patient is "always treated for UTIs". There is no evidence of current infection.  - Hold antibiotics  - Monitor for change in symptoms.

## 2018-07-14 NOTE — CHART NOTE - NSCHARTNOTEFT_GEN_A_CORE
79 y/o Turkish speaking female PMH HTN, HLD, DM2 (A1C unknown, managed by PCP), CKD 4 (nephrologist Dr. Joiner, Dr. Romo covering and notified), CVA with residual mild right-sided weakness, anemia, and Alzheimer's dementia (oriented to person and place), JOAO no CPAP, admitted to [facility] (March 2018) with c/o dyspnea and admitting diagnosis of CHF (cardiac cath recommended at that time) and echo during that admission showing moderate to severe AS, presents for cardiac catheterization today. Pt seen and evaluated by Dr. Rutherford and referred for further evaluation.     7/13 s/p X1 JOLE to RCA ( 80%) via RFA with Dr Rutherford   needs staged PCI to LAD on Monday   ASA/Plavix/Statin       Right groin           Further management as primary team 77 y/o Slovenian speaking female PMH HTN, HLD, DM2 (A1C unknown, managed by PCP), CKD 4 (nephrologist Dr. Joiner, Dr. Romo covering and notified), CVA with residual mild right-sided weakness, anemia, and Alzheimer's dementia (oriented to person and place), JOAO no CPAP, admitted to [facility] (March 2018) with c/o dyspnea and admitting diagnosis of CHF (cardiac cath recommended at that time) and echo during that admission showing moderate to severe AS, presents for cardiac catheterization today. Pt seen and evaluated by Dr. Rutherford and referred for further evaluation.     7/13 s/p X1 JOEL to RCA ( 80%) via RFA with Dr Rutherford   needs staged PCI to LAD on Monday   ASA/Plavix/Statin       Right groin intact, no hematoma, no bleeding, +pulses    Further management as primary team

## 2018-07-15 DIAGNOSIS — N30.01 ACUTE CYSTITIS WITH HEMATURIA: ICD-10-CM

## 2018-07-15 LAB
-  AMIKACIN: SIGNIFICANT CHANGE UP
-  AMOXICILLIN/CLAVULANIC ACID: SIGNIFICANT CHANGE UP
-  AMPICILLIN/SULBACTAM: SIGNIFICANT CHANGE UP
-  AMPICILLIN: SIGNIFICANT CHANGE UP
-  AZTREONAM: SIGNIFICANT CHANGE UP
-  CEFAZOLIN: SIGNIFICANT CHANGE UP
-  CEFEPIME: SIGNIFICANT CHANGE UP
-  CEFOXITIN: SIGNIFICANT CHANGE UP
-  CEFTRIAXONE: SIGNIFICANT CHANGE UP
-  CIPROFLOXACIN: SIGNIFICANT CHANGE UP
-  ERTAPENEM: SIGNIFICANT CHANGE UP
-  GENTAMICIN: SIGNIFICANT CHANGE UP
-  IMIPENEM: SIGNIFICANT CHANGE UP
-  LEVOFLOXACIN: SIGNIFICANT CHANGE UP
-  MEROPENEM: SIGNIFICANT CHANGE UP
-  NITROFURANTOIN: SIGNIFICANT CHANGE UP
-  PIPERACILLIN/TAZOBACTAM: SIGNIFICANT CHANGE UP
-  TIGECYCLINE: SIGNIFICANT CHANGE UP
-  TOBRAMYCIN: SIGNIFICANT CHANGE UP
-  TRIMETHOPRIM/SULFAMETHOXAZOLE: SIGNIFICANT CHANGE UP
ANION GAP SERPL CALC-SCNC: 13 MMOL/L — SIGNIFICANT CHANGE UP (ref 5–17)
BASOPHILS # BLD AUTO: 0.03 K/UL — SIGNIFICANT CHANGE UP (ref 0–0.2)
BASOPHILS NFR BLD AUTO: 0.2 % — SIGNIFICANT CHANGE UP (ref 0–2)
BUN SERPL-MCNC: 31 MG/DL — HIGH (ref 7–23)
CALCIUM SERPL-MCNC: 8.3 MG/DL — LOW (ref 8.4–10.5)
CHLORIDE SERPL-SCNC: 110 MMOL/L — HIGH (ref 96–108)
CO2 SERPL-SCNC: 22 MMOL/L — SIGNIFICANT CHANGE UP (ref 22–31)
CREAT SERPL-MCNC: 1.9 MG/DL — HIGH (ref 0.5–1.3)
CULTURE RESULTS: SIGNIFICANT CHANGE UP
EOSINOPHIL # BLD AUTO: 0.26 K/UL — SIGNIFICANT CHANGE UP (ref 0–0.5)
EOSINOPHIL NFR BLD AUTO: 1.9 % — SIGNIFICANT CHANGE UP (ref 0–6)
GLUCOSE BLDC GLUCOMTR-MCNC: 103 MG/DL — HIGH (ref 70–99)
GLUCOSE BLDC GLUCOMTR-MCNC: 104 MG/DL — HIGH (ref 70–99)
GLUCOSE BLDC GLUCOMTR-MCNC: 84 MG/DL — SIGNIFICANT CHANGE UP (ref 70–99)
GLUCOSE BLDC GLUCOMTR-MCNC: 90 MG/DL — SIGNIFICANT CHANGE UP (ref 70–99)
GLUCOSE SERPL-MCNC: 58 MG/DL — LOW (ref 70–99)
HCT VFR BLD CALC: 31.9 % — LOW (ref 34.5–45)
HGB BLD-MCNC: 10.5 G/DL — LOW (ref 11.5–15.5)
IMM GRANULOCYTES NFR BLD AUTO: 0.3 % — SIGNIFICANT CHANGE UP (ref 0–1.5)
LYMPHOCYTES # BLD AUTO: 16.8 % — SIGNIFICANT CHANGE UP (ref 13–44)
LYMPHOCYTES # BLD AUTO: 2.27 K/UL — SIGNIFICANT CHANGE UP (ref 1–3.3)
MAGNESIUM SERPL-MCNC: 1.8 MG/DL — SIGNIFICANT CHANGE UP (ref 1.6–2.6)
MCHC RBC-ENTMCNC: 29.9 PG — SIGNIFICANT CHANGE UP (ref 27–34)
MCHC RBC-ENTMCNC: 32.9 GM/DL — SIGNIFICANT CHANGE UP (ref 32–36)
MCV RBC AUTO: 90.9 FL — SIGNIFICANT CHANGE UP (ref 80–100)
METHOD TYPE: SIGNIFICANT CHANGE UP
MONOCYTES # BLD AUTO: 0.94 K/UL — HIGH (ref 0–0.9)
MONOCYTES NFR BLD AUTO: 7 % — SIGNIFICANT CHANGE UP (ref 2–14)
NEUTROPHILS # BLD AUTO: 9.95 K/UL — HIGH (ref 1.8–7.4)
NEUTROPHILS NFR BLD AUTO: 73.8 % — SIGNIFICANT CHANGE UP (ref 43–77)
ORGANISM # SPEC MICROSCOPIC CNT: SIGNIFICANT CHANGE UP
ORGANISM # SPEC MICROSCOPIC CNT: SIGNIFICANT CHANGE UP
PHOSPHATE SERPL-MCNC: 2.4 MG/DL — LOW (ref 2.5–4.5)
PLATELET # BLD AUTO: 289 K/UL — SIGNIFICANT CHANGE UP (ref 150–400)
POTASSIUM SERPL-MCNC: 4.1 MMOL/L — SIGNIFICANT CHANGE UP (ref 3.5–5.3)
POTASSIUM SERPL-SCNC: 4.1 MMOL/L — SIGNIFICANT CHANGE UP (ref 3.5–5.3)
RBC # BLD: 3.51 M/UL — LOW (ref 3.8–5.2)
RBC # FLD: 15.4 % — HIGH (ref 10.3–14.5)
SODIUM SERPL-SCNC: 145 MMOL/L — SIGNIFICANT CHANGE UP (ref 135–145)
SPECIMEN SOURCE: SIGNIFICANT CHANGE UP
WBC # BLD: 13.49 K/UL — HIGH (ref 3.8–10.5)
WBC # FLD AUTO: 13.49 K/UL — HIGH (ref 3.8–10.5)

## 2018-07-15 PROCEDURE — 71045 X-RAY EXAM CHEST 1 VIEW: CPT | Mod: 26

## 2018-07-15 PROCEDURE — 99233 SBSQ HOSP IP/OBS HIGH 50: CPT

## 2018-07-15 RX ORDER — CEFTRIAXONE 500 MG/1
INJECTION, POWDER, FOR SOLUTION INTRAMUSCULAR; INTRAVENOUS
Qty: 0 | Refills: 0 | Status: DISCONTINUED | OUTPATIENT
Start: 2018-07-15 | End: 2018-07-19

## 2018-07-15 RX ORDER — SODIUM,POTASSIUM PHOSPHATES 278-250MG
1 POWDER IN PACKET (EA) ORAL ONCE
Qty: 0 | Refills: 0 | Status: COMPLETED | OUTPATIENT
Start: 2018-07-15 | End: 2018-07-15

## 2018-07-15 RX ORDER — CEFTRIAXONE 500 MG/1
1 INJECTION, POWDER, FOR SOLUTION INTRAMUSCULAR; INTRAVENOUS EVERY 24 HOURS
Qty: 0 | Refills: 0 | Status: DISCONTINUED | OUTPATIENT
Start: 2018-07-16 | End: 2018-07-19

## 2018-07-15 RX ORDER — CEFTRIAXONE 500 MG/1
1 INJECTION, POWDER, FOR SOLUTION INTRAMUSCULAR; INTRAVENOUS ONCE
Qty: 0 | Refills: 0 | Status: COMPLETED | OUTPATIENT
Start: 2018-07-15 | End: 2018-07-15

## 2018-07-15 RX ADMIN — AMLODIPINE BESYLATE 10 MILLIGRAM(S): 2.5 TABLET ORAL at 05:13

## 2018-07-15 RX ADMIN — CEFTRIAXONE 100 GRAM(S): 500 INJECTION, POWDER, FOR SOLUTION INTRAMUSCULAR; INTRAVENOUS at 10:04

## 2018-07-15 RX ADMIN — Medication 50 MILLIGRAM(S): at 05:13

## 2018-07-15 RX ADMIN — HEPARIN SODIUM 5000 UNIT(S): 5000 INJECTION INTRAVENOUS; SUBCUTANEOUS at 18:15

## 2018-07-15 RX ADMIN — HEPARIN SODIUM 5000 UNIT(S): 5000 INJECTION INTRAVENOUS; SUBCUTANEOUS at 05:13

## 2018-07-15 RX ADMIN — ATORVASTATIN CALCIUM 40 MILLIGRAM(S): 80 TABLET, FILM COATED ORAL at 21:09

## 2018-07-15 RX ADMIN — CLOPIDOGREL BISULFATE 75 MILLIGRAM(S): 75 TABLET, FILM COATED ORAL at 13:11

## 2018-07-15 RX ADMIN — MEMANTINE HYDROCHLORIDE 10 MILLIGRAM(S): 10 TABLET ORAL at 05:13

## 2018-07-15 RX ADMIN — Medication 50 MILLIGRAM(S): at 21:09

## 2018-07-15 RX ADMIN — MEMANTINE HYDROCHLORIDE 10 MILLIGRAM(S): 10 TABLET ORAL at 21:09

## 2018-07-15 RX ADMIN — DONEPEZIL HYDROCHLORIDE 10 MILLIGRAM(S): 10 TABLET, FILM COATED ORAL at 21:09

## 2018-07-15 RX ADMIN — Medication 81 MILLIGRAM(S): at 13:11

## 2018-07-15 RX ADMIN — CARVEDILOL PHOSPHATE 12.5 MILLIGRAM(S): 80 CAPSULE, EXTENDED RELEASE ORAL at 05:13

## 2018-07-15 RX ADMIN — INSULIN GLARGINE 13 UNIT(S): 100 INJECTION, SOLUTION SUBCUTANEOUS at 13:12

## 2018-07-15 RX ADMIN — Medication 1200 MILLIGRAM(S): at 05:13

## 2018-07-15 RX ADMIN — Medication 1 TABLET(S): at 10:04

## 2018-07-15 RX ADMIN — CARVEDILOL PHOSPHATE 12.5 MILLIGRAM(S): 80 CAPSULE, EXTENDED RELEASE ORAL at 18:16

## 2018-07-15 RX ADMIN — CITALOPRAM 20 MILLIGRAM(S): 10 TABLET, FILM COATED ORAL at 13:11

## 2018-07-15 RX ADMIN — Medication 48 MILLIGRAM(S): at 21:09

## 2018-07-15 RX ADMIN — Medication 25 MILLIGRAM(S): at 13:11

## 2018-07-15 RX ADMIN — Medication 25 MICROGRAM(S): at 05:13

## 2018-07-16 LAB
ANION GAP SERPL CALC-SCNC: 16 MMOL/L — SIGNIFICANT CHANGE UP (ref 5–17)
BUN SERPL-MCNC: 34 MG/DL — HIGH (ref 7–23)
CALCIUM SERPL-MCNC: 8 MG/DL — LOW (ref 8.4–10.5)
CHLORIDE SERPL-SCNC: 106 MMOL/L — SIGNIFICANT CHANGE UP (ref 96–108)
CO2 SERPL-SCNC: 20 MMOL/L — LOW (ref 22–31)
CREAT SERPL-MCNC: 2.4 MG/DL — HIGH (ref 0.5–1.3)
GLUCOSE BLDC GLUCOMTR-MCNC: 126 MG/DL — HIGH (ref 70–99)
GLUCOSE BLDC GLUCOMTR-MCNC: 133 MG/DL — HIGH (ref 70–99)
GLUCOSE BLDC GLUCOMTR-MCNC: 80 MG/DL — SIGNIFICANT CHANGE UP (ref 70–99)
GLUCOSE SERPL-MCNC: 90 MG/DL — SIGNIFICANT CHANGE UP (ref 70–99)
HCT VFR BLD CALC: 29.7 % — LOW (ref 34.5–45)
HGB BLD-MCNC: 10 G/DL — LOW (ref 11.5–15.5)
MCHC RBC-ENTMCNC: 31.6 PG — SIGNIFICANT CHANGE UP (ref 27–34)
MCHC RBC-ENTMCNC: 33.6 GM/DL — SIGNIFICANT CHANGE UP (ref 32–36)
MCV RBC AUTO: 94.1 FL — SIGNIFICANT CHANGE UP (ref 80–100)
PLATELET # BLD AUTO: 237 K/UL — SIGNIFICANT CHANGE UP (ref 150–400)
POTASSIUM SERPL-MCNC: 4 MMOL/L — SIGNIFICANT CHANGE UP (ref 3.5–5.3)
POTASSIUM SERPL-SCNC: 4 MMOL/L — SIGNIFICANT CHANGE UP (ref 3.5–5.3)
RBC # BLD: 3.15 M/UL — LOW (ref 3.8–5.2)
RBC # FLD: 13.7 % — SIGNIFICANT CHANGE UP (ref 10.3–14.5)
SODIUM SERPL-SCNC: 142 MMOL/L — SIGNIFICANT CHANGE UP (ref 135–145)
WBC # BLD: 12.1 K/UL — HIGH (ref 3.8–10.5)
WBC # FLD AUTO: 12.1 K/UL — HIGH (ref 3.8–10.5)

## 2018-07-16 PROCEDURE — 93010 ELECTROCARDIOGRAM REPORT: CPT

## 2018-07-16 PROCEDURE — 99233 SBSQ HOSP IP/OBS HIGH 50: CPT

## 2018-07-16 PROCEDURE — 99152 MOD SED SAME PHYS/QHP 5/>YRS: CPT

## 2018-07-16 PROCEDURE — 92928 PRQ TCAT PLMT NTRAC ST 1 LES: CPT | Mod: LD

## 2018-07-16 RX ORDER — FENTANYL CITRATE 50 UG/ML
25 INJECTION INTRAVENOUS ONCE
Qty: 0 | Refills: 0 | Status: DISCONTINUED | OUTPATIENT
Start: 2018-07-16 | End: 2018-07-16

## 2018-07-16 RX ADMIN — CARVEDILOL PHOSPHATE 12.5 MILLIGRAM(S): 80 CAPSULE, EXTENDED RELEASE ORAL at 05:28

## 2018-07-16 RX ADMIN — DONEPEZIL HYDROCHLORIDE 10 MILLIGRAM(S): 10 TABLET, FILM COATED ORAL at 21:06

## 2018-07-16 RX ADMIN — CLOPIDOGREL BISULFATE 75 MILLIGRAM(S): 75 TABLET, FILM COATED ORAL at 12:08

## 2018-07-16 RX ADMIN — CARVEDILOL PHOSPHATE 12.5 MILLIGRAM(S): 80 CAPSULE, EXTENDED RELEASE ORAL at 21:06

## 2018-07-16 RX ADMIN — FENTANYL CITRATE 25 MICROGRAM(S): 50 INJECTION INTRAVENOUS at 22:17

## 2018-07-16 RX ADMIN — Medication 81 MILLIGRAM(S): at 12:08

## 2018-07-16 RX ADMIN — MEMANTINE HYDROCHLORIDE 10 MILLIGRAM(S): 10 TABLET ORAL at 05:28

## 2018-07-16 RX ADMIN — Medication 25 MILLIGRAM(S): at 12:08

## 2018-07-16 RX ADMIN — Medication 48 MILLIGRAM(S): at 21:06

## 2018-07-16 RX ADMIN — FENTANYL CITRATE 25 MICROGRAM(S): 50 INJECTION INTRAVENOUS at 22:47

## 2018-07-16 RX ADMIN — AMLODIPINE BESYLATE 10 MILLIGRAM(S): 2.5 TABLET ORAL at 05:28

## 2018-07-16 RX ADMIN — ATORVASTATIN CALCIUM 40 MILLIGRAM(S): 80 TABLET, FILM COATED ORAL at 21:06

## 2018-07-16 RX ADMIN — Medication 50 MILLIGRAM(S): at 21:05

## 2018-07-16 RX ADMIN — HEPARIN SODIUM 5000 UNIT(S): 5000 INJECTION INTRAVENOUS; SUBCUTANEOUS at 05:28

## 2018-07-16 RX ADMIN — CITALOPRAM 20 MILLIGRAM(S): 10 TABLET, FILM COATED ORAL at 12:08

## 2018-07-16 RX ADMIN — CEFTRIAXONE 100 GRAM(S): 500 INJECTION, POWDER, FOR SOLUTION INTRAMUSCULAR; INTRAVENOUS at 10:47

## 2018-07-16 RX ADMIN — Medication 50 MILLIGRAM(S): at 05:28

## 2018-07-16 RX ADMIN — MEMANTINE HYDROCHLORIDE 10 MILLIGRAM(S): 10 TABLET ORAL at 21:06

## 2018-07-16 RX ADMIN — Medication 25 MICROGRAM(S): at 05:28

## 2018-07-16 NOTE — CHART NOTE - NSCHARTNOTEFT_GEN_A_CORE
Post Cath Note:  Patient is an 77 yo woman with a PMHx of HTN, HLD, DM2, CKD4, CVA with residual mild right-sided weakness, anemia, Alzheimer's dementia, JOAO on CPAP presenting from OSH with dyspnea, TTE there showed mod-severe AS. S/p Cath  7/13 - X1 JOEL to RCA ( 80%) via RFA. Plan for staged PCI to LAD today.     Exam:  Right groin site nontender, no erythema, no bruit. No evidence of hematoma  LE pulses 2+     Plan:  -Staged PCI to LAD today  -Continue asa/plavix

## 2018-07-16 NOTE — CHART NOTE - NSCHARTNOTEFT_GEN_A_CORE
Removal of Femoral Sheath    Pulses in the right lower extremity are palpable. The patient was placed in the supine position. The insertion site was identified and the sutures were removed per protocol.  The 6 Congolese femoral sheath was then removed. Direct pressure was applied for 18 minutes.     Monitoring of the right groin and both lower extremities including neuro-vascular checks and vital signs every 15 minutes x 4, then every 30 minutes x 2, then every 1 hour was ordered.    Complications: None    Comments: No bleeding, ecchymosis or hematoma. Site cleaned with chlorhexidine, dressing placed and sandbag applied. Patient instructed not to move extremity.

## 2018-07-17 ENCOUNTER — TRANSCRIPTION ENCOUNTER (OUTPATIENT)
Age: 78
End: 2018-07-17

## 2018-07-17 DIAGNOSIS — M79.604 PAIN IN RIGHT LEG: ICD-10-CM

## 2018-07-17 LAB
ANION GAP SERPL CALC-SCNC: 15 MMOL/L — SIGNIFICANT CHANGE UP (ref 5–17)
BASOPHILS # BLD AUTO: 0.01 K/UL — SIGNIFICANT CHANGE UP (ref 0–0.2)
BASOPHILS NFR BLD AUTO: 0.1 % — SIGNIFICANT CHANGE UP (ref 0–2)
BUN SERPL-MCNC: 36 MG/DL — HIGH (ref 7–23)
CALCIUM SERPL-MCNC: 7.8 MG/DL — LOW (ref 8.4–10.5)
CHLORIDE SERPL-SCNC: 106 MMOL/L — SIGNIFICANT CHANGE UP (ref 96–108)
CO2 SERPL-SCNC: 22 MMOL/L — SIGNIFICANT CHANGE UP (ref 22–31)
CREAT SERPL-MCNC: 2.23 MG/DL — HIGH (ref 0.5–1.3)
EOSINOPHIL # BLD AUTO: 0.37 K/UL — SIGNIFICANT CHANGE UP (ref 0–0.5)
EOSINOPHIL NFR BLD AUTO: 3.6 % — SIGNIFICANT CHANGE UP (ref 0–6)
GLUCOSE BLDC GLUCOMTR-MCNC: 105 MG/DL — HIGH (ref 70–99)
GLUCOSE BLDC GLUCOMTR-MCNC: 116 MG/DL — HIGH (ref 70–99)
GLUCOSE BLDC GLUCOMTR-MCNC: 156 MG/DL — HIGH (ref 70–99)
GLUCOSE BLDC GLUCOMTR-MCNC: 199 MG/DL — HIGH (ref 70–99)
GLUCOSE SERPL-MCNC: 105 MG/DL — HIGH (ref 70–99)
HCT VFR BLD CALC: 28.2 % — LOW (ref 34.5–45)
HGB BLD-MCNC: 9 G/DL — LOW (ref 11.5–15.5)
IMM GRANULOCYTES NFR BLD AUTO: 0.3 % — SIGNIFICANT CHANGE UP (ref 0–1.5)
LYMPHOCYTES # BLD AUTO: 1.96 K/UL — SIGNIFICANT CHANGE UP (ref 1–3.3)
LYMPHOCYTES # BLD AUTO: 19 % — SIGNIFICANT CHANGE UP (ref 13–44)
MAGNESIUM SERPL-MCNC: 2.1 MG/DL — SIGNIFICANT CHANGE UP (ref 1.6–2.6)
MCHC RBC-ENTMCNC: 29.1 PG — SIGNIFICANT CHANGE UP (ref 27–34)
MCHC RBC-ENTMCNC: 31.9 GM/DL — LOW (ref 32–36)
MCV RBC AUTO: 91.3 FL — SIGNIFICANT CHANGE UP (ref 80–100)
MONOCYTES # BLD AUTO: 0.78 K/UL — SIGNIFICANT CHANGE UP (ref 0–0.9)
MONOCYTES NFR BLD AUTO: 7.6 % — SIGNIFICANT CHANGE UP (ref 2–14)
NEUTROPHILS # BLD AUTO: 7.17 K/UL — SIGNIFICANT CHANGE UP (ref 1.8–7.4)
NEUTROPHILS NFR BLD AUTO: 69.4 % — SIGNIFICANT CHANGE UP (ref 43–77)
PHOSPHATE SERPL-MCNC: 3.5 MG/DL — SIGNIFICANT CHANGE UP (ref 2.5–4.5)
PLATELET # BLD AUTO: 291 K/UL — SIGNIFICANT CHANGE UP (ref 150–400)
POTASSIUM SERPL-MCNC: 4 MMOL/L — SIGNIFICANT CHANGE UP (ref 3.5–5.3)
POTASSIUM SERPL-SCNC: 4 MMOL/L — SIGNIFICANT CHANGE UP (ref 3.5–5.3)
RBC # BLD: 3.09 M/UL — LOW (ref 3.8–5.2)
RBC # FLD: 15.3 % — HIGH (ref 10.3–14.5)
SODIUM SERPL-SCNC: 143 MMOL/L — SIGNIFICANT CHANGE UP (ref 135–145)
WBC # BLD: 10.32 K/UL — SIGNIFICANT CHANGE UP (ref 3.8–10.5)
WBC # FLD AUTO: 10.32 K/UL — SIGNIFICANT CHANGE UP (ref 3.8–10.5)

## 2018-07-17 PROCEDURE — 99233 SBSQ HOSP IP/OBS HIGH 50: CPT

## 2018-07-17 RX ORDER — LIDOCAINE 4 G/100G
1 CREAM TOPICAL DAILY
Qty: 0 | Refills: 0 | Status: DISCONTINUED | OUTPATIENT
Start: 2018-07-17 | End: 2018-07-19

## 2018-07-17 RX ORDER — ACETAMINOPHEN 500 MG
650 TABLET ORAL EVERY 6 HOURS
Qty: 0 | Refills: 0 | Status: DISCONTINUED | OUTPATIENT
Start: 2018-07-17 | End: 2018-07-19

## 2018-07-17 RX ADMIN — Medication 48 MILLIGRAM(S): at 21:33

## 2018-07-17 RX ADMIN — AMLODIPINE BESYLATE 10 MILLIGRAM(S): 2.5 TABLET ORAL at 06:16

## 2018-07-17 RX ADMIN — CLOPIDOGREL BISULFATE 75 MILLIGRAM(S): 75 TABLET, FILM COATED ORAL at 12:13

## 2018-07-17 RX ADMIN — CARVEDILOL PHOSPHATE 12.5 MILLIGRAM(S): 80 CAPSULE, EXTENDED RELEASE ORAL at 17:54

## 2018-07-17 RX ADMIN — MEMANTINE HYDROCHLORIDE 10 MILLIGRAM(S): 10 TABLET ORAL at 21:33

## 2018-07-17 RX ADMIN — CARVEDILOL PHOSPHATE 12.5 MILLIGRAM(S): 80 CAPSULE, EXTENDED RELEASE ORAL at 06:16

## 2018-07-17 RX ADMIN — HEPARIN SODIUM 5000 UNIT(S): 5000 INJECTION INTRAVENOUS; SUBCUTANEOUS at 17:54

## 2018-07-17 RX ADMIN — Medication 25 MICROGRAM(S): at 06:16

## 2018-07-17 RX ADMIN — Medication 2: at 12:05

## 2018-07-17 RX ADMIN — Medication 25 MILLIGRAM(S): at 12:12

## 2018-07-17 RX ADMIN — Medication 50 MILLIGRAM(S): at 21:33

## 2018-07-17 RX ADMIN — INSULIN GLARGINE 13 UNIT(S): 100 INJECTION, SOLUTION SUBCUTANEOUS at 12:19

## 2018-07-17 RX ADMIN — CEFTRIAXONE 100 GRAM(S): 500 INJECTION, POWDER, FOR SOLUTION INTRAMUSCULAR; INTRAVENOUS at 09:10

## 2018-07-17 RX ADMIN — MEMANTINE HYDROCHLORIDE 10 MILLIGRAM(S): 10 TABLET ORAL at 06:16

## 2018-07-17 RX ADMIN — ATORVASTATIN CALCIUM 40 MILLIGRAM(S): 80 TABLET, FILM COATED ORAL at 21:33

## 2018-07-17 RX ADMIN — Medication 81 MILLIGRAM(S): at 12:14

## 2018-07-17 RX ADMIN — DONEPEZIL HYDROCHLORIDE 10 MILLIGRAM(S): 10 TABLET, FILM COATED ORAL at 21:33

## 2018-07-17 RX ADMIN — LIDOCAINE 1 PATCH: 4 CREAM TOPICAL at 17:54

## 2018-07-17 RX ADMIN — CITALOPRAM 20 MILLIGRAM(S): 10 TABLET, FILM COATED ORAL at 12:15

## 2018-07-17 RX ADMIN — Medication 50 MILLIGRAM(S): at 06:16

## 2018-07-17 NOTE — DISCHARGE NOTE ADULT - PLAN OF CARE
No further symptoms Continue your medications. Do not stop Aspirin or Plavix unless instructed by your cardiologist. No heavy lifting, strenuous activity, bending, straining or unnecessary stair climbing for 2 weeks. No sex for 1 week. No driving for 2 days. You may shower 24 hours following procedure but avoid baths and swimming for 1 week. Check groin site for bleeding and/or swelling daily following procedure. Call your doctor/cardiologist immediately for bleeding or swelling or if you have increased/persistent pain, fever/chills, or drainage at the site. Follow up with your cardiologist in 1- 2 weeks. You may call [facility] at [phone] or [phone] after office hours and weekends with any questions or concerns following our procedure. Completed antibiotics  Drink enough water and fluids to keep your urine clear or pale yellow.  Avoid caffeine, tea, and carbonated beverages. They tend to irritate your bladder.  Empty your bladder often. Avoid holding urine for long periods of time.  Empty your bladder before and after sexual intercourse.  After a bowel movement, women should cleanse from front to back. Use each tissue only once.  SEEK MEDICAL CARE IF:  You have back pain.  You develop a fever.  Your symptoms do not begin to resolve within 3 days.  SEEK IMMEDIATE MEDICAL CARE IF:  You have severe back pain or lower abdominal pain.  You develop chills.  You have nausea or vomiting.  You have continued burning or discomfort with urination. Avoid taking (NSAIDs) - (ex: Ibuprofen, Advil, Celebrex, Naprosyn)  Avoid taking any nephrotoxic agents (can harm kidneys) - Intravenous contrast for diagnostic testing, combination cold medications.  Have all medications adjusted for your renal function by your Health Care Provider.  Blood pressure control is important.  Take all medication as prescribed. Now improved  Continue tylenol as recommended Follow up with PMD in 1 week   HgA1C this admission - 6.4  Make sure you get your HgA1c checked every three months.  If you take insulin, check your blood glucose before meals and at bedtime.  It's important not to skip any meals.  Keep a log of your blood glucose results and always take it with you to your doctor appointments.  Keep a list of your current medications including injectables and over the counter medications and bring this medication list with you to all your doctor appointments.  If you have not seen your ophthalmologist this year call for appointment.  Check your feet daily for redness, sores, or openings. Do not self treat. If no improvement in two days call your primary care physician for an appointment.  Low blood sugar (hypoglycemia) is a blood sugar below 70mg/dl. Check your blood sugar if you feel signs/symptoms of hypoglycemia. If your blood sugar is below 70 take 15 grams of carbohydrates (ex 4 oz of apple juice, 3-4 glucose tablets, or 4-6 oz of regular soda) wait 15 minutes and repeat blood sugar to make sure it comes up above 70.  If your blood sugar is above 70 and you are due for a meal, have a meal.  If you are not due for a meal have a snack.  This snack helps keeps your blood sugar at a safe range. Dementia causes memory problems and make it hard to think clear. The symptoms of dementia often start off very mild and get worse slowly. Symptoms are forgetfulness, confusion, trouble with language, getting lost in familiar places. As dementia gets worse, it can cause anger or aggression, see things that aren't there, decreased ability to eat, bathe, dress, or do other everyday tasks, or cause people to lose bladder and bowel control. Alzheimer disease, there are medicines that might help some. If you have vascular dementia, your doctor will focus on keeping your blood pressure and cholesterol as normal as possible. Sadly, there really aren't good treatments for most types of dementia.  Prevent falls and accidents by securing loose rugs or use non-skid rugs, loose wires or electrical cords. Wear sturdy, comfortable shoes, keep walkways well lit. There are no proven ways to prevent dementia. But encourage physical activity, healthy diet and social interaction to help keep the brain healthy. Keep medications, sharp knives, lighters, matches, power tools, and guns out of reach. Lower the temperature on water heaters. Keep familiar objects and people around. Use reminders, notes, or directions for daily activities or tasks. Keep a simple, consistent routine for waking, meals, bathing, dressing, and bedtime. Display emergency numbers and home address near all telephones.   SEEK MEDICAL CARE IF: New behavioral problems start such as moodiness, aggressiveness, or seeing things that are not there (hallucinations). Any new problem with brain function happens. This includes problems with balance, speech, swallowing difficulty or falling a lot. Small changes or worsening in any aspect of brain function can be a sign that the illness is getting worse or a sign of infection. Seeing a caregiver right away is important.   SEEK IMMEDIATE MEDICAL CARE IF: New or worsened confusion, sleepiness, or inability to sleep develops. you do not make enough thyroid hormone  signs & symptoms of low levels of thyroid hormone - tired, getting cold easily, coarse or thin hair, constipation, shortness of breath, swelling, irregular periods  your doctor will do thyroid hormone blood tests at least once a year to monitor if medication dose is adequate  take your thyroid medicine as directed by your doctor & on empty stomach

## 2018-07-17 NOTE — DISCHARGE NOTE ADULT - MEDICATION SUMMARY - MEDICATIONS TO TAKE
I will START or STAY ON the medications listed below when I get home from the hospital:    Aspirin Enteric Coated 81 mg oral delayed release tablet  -- 1 tab(s) by mouth once a day  -- Indication: For Cardiac stents     acetaminophen 325 mg oral tablet  -- 2 tab(s) by mouth every 6 hours  STOP after 3 days  -- Indication: For Pain    citalopram 20 mg oral tablet  -- 1 tab(s) by mouth once a day  -- Indication: For Alzheimer's dementia without behavioral disturbance, unspecified timing of dementia onset    Levemir FlexPen 100 units/mL subcutaneous solution  -- 16 unit(s) subcutaneous once a day before lunch  -- Indication: For Blood sugar control    fenofibrate 54 mg oral tablet  -- 1 tab(s) by mouth once a day  -- Indication: For Cholesterol control    atorvastatin 40 mg oral tablet  -- 1 tab(s) by mouth once a day (at bedtime)  -- Indication: For Cholesterol control    clopidogrel 75 mg oral tablet  -- 1 tab(s) by mouth once a day  -- Indication: For Heart stents    carvedilol 12.5 mg oral tablet  -- 1 tab(s) by mouth 2 times a day  -- Indication: For Blood pressure control    amLODIPine 10 mg oral tablet  -- 1 tab(s) by mouth once a day  -- Indication: For Blood pressure control    Namzaric 28 mg-10 mg oral capsule, extended release  -- 1 cap(s) by mouth once a day  -- Indication: For Alzheimer's dementia without behavioral disturbance, unspecified timing of dementia onset    levothyroxine 25 mcg (0.025 mg) oral tablet  -- 1 tab(s) by mouth once a day  -- Indication: For Thyroid    hydrALAZINE 50 mg oral tablet  -- 1 tab(s) by mouth 2 times a day in the morning and evening  -- Indication: For Blood pressure control    hydrALAZINE 25 mg oral tablet  -- 1 tab(s) by mouth once a day in the afternoon  -- Indication: For Blood pressure control

## 2018-07-17 NOTE — DISCHARGE NOTE ADULT - CARE PROVIDERS DIRECT ADDRESSES
[email or web address]

## 2018-07-17 NOTE — DISCHARGE NOTE ADULT - CARE PLAN
Principal Discharge DX:	Coronary artery disease involving native coronary artery of native heart without angina pectoris  Goal:	No further symptoms  Assessment and plan of treatment:	Continue your medications. Do not stop Aspirin or Plavix unless instructed by your cardiologist. No heavy lifting, strenuous activity, bending, straining or unnecessary stair climbing for 2 weeks. No sex for 1 week. No driving for 2 days. You may shower 24 hours following procedure but avoid baths and swimming for 1 week. Check groin site for bleeding and/or swelling daily following procedure. Call your doctor/cardiologist immediately for bleeding or swelling or if you have increased/persistent pain, fever/chills, or drainage at the site. Follow up with your cardiologist in 1- 2 weeks. You may call [facility] at [phone] or [phone] after office hours and weekends with any questions or concerns following our procedure.  Secondary Diagnosis:	Acute cystitis with hematuria  Assessment and plan of treatment:	Completed antibiotics  Drink enough water and fluids to keep your urine clear or pale yellow.  Avoid caffeine, tea, and carbonated beverages. They tend to irritate your bladder.  Empty your bladder often. Avoid holding urine for long periods of time.  Empty your bladder before and after sexual intercourse.  After a bowel movement, women should cleanse from front to back. Use each tissue only once.  SEEK MEDICAL CARE IF:  You have back pain.  You develop a fever.  Your symptoms do not begin to resolve within 3 days.  SEEK IMMEDIATE MEDICAL CARE IF:  You have severe back pain or lower abdominal pain.  You develop chills.  You have nausea or vomiting.  You have continued burning or discomfort with urination.  Secondary Diagnosis:	Chronic kidney disease (CKD), stage IV (severe)  Assessment and plan of treatment:	Avoid taking (NSAIDs) - (ex: Ibuprofen, Advil, Celebrex, Naprosyn)  Avoid taking any nephrotoxic agents (can harm kidneys) - Intravenous contrast for diagnostic testing, combination cold medications.  Have all medications adjusted for your renal function by your Health Care Provider.  Blood pressure control is important.  Take all medication as prescribed.  Secondary Diagnosis:	Leg pain, anterior, right  Assessment and plan of treatment:	Now improved  Continue tylenol as recommended  Secondary Diagnosis:	Type 2 diabetes mellitus with stage 4 chronic kidney disease, without long-term current use of insulin  Assessment and plan of treatment:	Follow up with PMD in 1 week   HgA1C this admission - 6.4  Make sure you get your HgA1c checked every three months.  If you take insulin, check your blood glucose before meals and at bedtime.  It's important not to skip any meals.  Keep a log of your blood glucose results and always take it with you to your doctor appointments.  Keep a list of your current medications including injectables and over the counter medications and bring this medication list with you to all your doctor appointments.  If you have not seen your ophthalmologist this year call for appointment.  Check your feet daily for redness, sores, or openings. Do not self treat. If no improvement in two days call your primary care physician for an appointment.  Low blood sugar (hypoglycemia) is a blood sugar below 70mg/dl. Check your blood sugar if you feel signs/symptoms of hypoglycemia. If your blood sugar is below 70 take 15 grams of carbohydrates (ex 4 oz of apple juice, 3-4 glucose tablets, or 4-6 oz of regular soda) wait 15 minutes and repeat blood sugar to make sure it comes up above 70.  If your blood sugar is above 70 and you are due for a meal, have a meal.  If you are not due for a meal have a snack.  This snack helps keeps your blood sugar at a safe range.  Secondary Diagnosis:	Alzheimer's dementia without behavioral disturbance, unspecified timing of dementia onset  Assessment and plan of treatment:	Dementia causes memory problems and make it hard to think clear. The symptoms of dementia often start off very mild and get worse slowly. Symptoms are forgetfulness, confusion, trouble with language, getting lost in familiar places. As dementia gets worse, it can cause anger or aggression, see things that aren't there, decreased ability to eat, bathe, dress, or do other everyday tasks, or cause people to lose bladder and bowel control. Alzheimer disease, there are medicines that might help some. If you have vascular dementia, your doctor will focus on keeping your blood pressure and cholesterol as normal as possible. Sadly, there really aren't good treatments for most types of dementia.  Prevent falls and accidents by securing loose rugs or use non-skid rugs, loose wires or electrical cords. Wear sturdy, comfortable shoes, keep walkways well lit. There are no proven ways to prevent dementia. But encourage physical activity, healthy diet and social interaction to help keep the brain healthy. Keep medications, sharp knives, lighters, matches, power tools, and guns out of reach. Lower the temperature on water heaters. Keep familiar objects and people around. Use reminders, notes, or directions for daily activities or tasks. Keep a simple, consistent routine for waking, meals, bathing, dressing, and bedtime. Display emergency numbers and home address near all telephones.   SEEK MEDICAL CARE IF: New behavioral problems start such as moodiness, aggressiveness, or seeing things that are not there (hallucinations). Any new problem with brain function happens. This includes problems with balance, speech, swallowing difficulty or falling a lot. Small changes or worsening in any aspect of brain function can be a sign that the illness is getting worse or a sign of infection. Seeing a caregiver right away is important.   SEEK IMMEDIATE MEDICAL CARE IF: New or worsened confusion, sleepiness, or inability to sleep develops.  Secondary Diagnosis:	Hypothyroidism, unspecified type  Assessment and plan of treatment:	you do not make enough thyroid hormone  signs & symptoms of low levels of thyroid hormone - tired, getting cold easily, coarse or thin hair, constipation, shortness of breath, swelling, irregular periods  your doctor will do thyroid hormone blood tests at least once a year to monitor if medication dose is adequate  take your thyroid medicine as directed by your doctor & on empty stomach

## 2018-07-17 NOTE — CHART NOTE - NSCHARTNOTEFT_GEN_A_CORE
Post Cath Note:  Patient is an 77 yo woman with a PMHx of HTN, HLD, DM2, CKD4, CVA with residual mild right-sided weakness, anemia, Alzheimer's dementia, JOAO on CPAP presenting from OSH with dyspnea, TTE there showed mod-severe AS. S/p Cath 7/13 - X1 JOEL to RCA ( 80%) via RFA. S/p staged PCI to LAD 7/16 -X1 JOEL to prox LAD (80%). Today, paitent denies chest pain, shortness of breath. Denies pain at the right groin site. No events on telemetry.    Exam:  Right groin site nontender, no erythema, no bruit. Mild ecchymosis. No evidence of hematoma.  LE pulses 2+     Plan:  -Continue asa/plavix for 1 year  -Follow up with primary cardiologist

## 2018-07-17 NOTE — DISCHARGE NOTE ADULT - HOSPITAL COURSE
79 y/o woman w/ PMH HTN, HLD, DM2, CKD 4, CVA with residual mild right-sided weakness, anemia, and Alzheimer's dementia, JOAO no CPAP, and mod-severe AS admitted w/ dyspnea s/p cath 7/16  Patient s/p staged PCI to RCA on 7/13 and LAD on 7/16. Tolerated procedures well. Continue DAPT, lipitor 40 mg qhs, and coreg.  During hospital stay found to have hematuria, low grade temperature to 100.3 on 7/15 w/ new leukocytosis now downtrending. CXR w/ no consolidation. UA positive, Ucx with >100,000 E coli, Blood cultures negative completed course of Ceftriaxone.  Patient with stage 4 chronic kidney disease. Cr remains stable. s/p mucomyst and IVF in setting of contrast with cath. Avoid nephrotoxins  Pt with pain to medical aspect of anterior right leg. Has had for some time. Daughter concerned. Suspect likely musculoskeletal etiology.   - US lower extremity negative for DVT, continue Tylenol ATC for three days, Lidoderm patch to area.   Now with resolved leukocytosis and afebrile, cleared for discharge

## 2018-07-17 NOTE — DISCHARGE NOTE ADULT - PATIENT PORTAL LINK FT
You can access the [facility], offered by [facility], by registering with the following website: [email or web address]

## 2018-07-17 NOTE — DISCHARGE NOTE ADULT - SECONDARY DIAGNOSIS.
Acute cystitis with hematuria Chronic kidney disease (CKD), stage IV (severe) Leg pain, anterior, right Type 2 diabetes mellitus with stage 4 chronic kidney disease, without long-term current use of insulin Alzheimer's dementia without behavioral disturbance, unspecified timing of dementia onset Hypothyroidism, unspecified type

## 2018-07-17 NOTE — DISCHARGE NOTE ADULT - ADDITIONAL INSTRUCTIONS
Follow up with cardiologist - Dr. Rutherford in 1 week Follow up with cardiologist - Dr. Rutherford in 1 week  Follow up with PMD - Dr. Veliz in 1 week - call for appointment Follow up with cardiologist - Dr. Rutherford in 1 week  Follow up with PMD - Dr. Veliz in 1 week - call for appointment  Follow up with Nephrologist in 1 week - call for appointment

## 2018-07-17 NOTE — DISCHARGE NOTE ADULT - CARE PROVIDER_API CALL
Samy Rutherford), Cardiovascular Disease; Interventional Cardiology  [address] [place] [address]  Phone: [phone]  Fax: [phone] Samy Rutherford), Cardiovascular Disease; Interventional Cardiology  [address] [place] [address]  Phone: [phone]  Fax: [phone]    Isaiah Veliz), Geriatric Medicine; Internal Medicine  [address] [place] [address]  Phone: [phone]  Fax: [phone]

## 2018-07-18 LAB
ANION GAP SERPL CALC-SCNC: 14 MMOL/L — SIGNIFICANT CHANGE UP (ref 5–17)
BUN SERPL-MCNC: 43 MG/DL — HIGH (ref 7–23)
CALCIUM SERPL-MCNC: 8 MG/DL — LOW (ref 8.4–10.5)
CHLORIDE SERPL-SCNC: 109 MMOL/L — HIGH (ref 96–108)
CO2 SERPL-SCNC: 21 MMOL/L — LOW (ref 22–31)
CREAT SERPL-MCNC: 2.28 MG/DL — HIGH (ref 0.5–1.3)
GLUCOSE BLDC GLUCOMTR-MCNC: 119 MG/DL — HIGH (ref 70–99)
GLUCOSE BLDC GLUCOMTR-MCNC: 142 MG/DL — HIGH (ref 70–99)
GLUCOSE BLDC GLUCOMTR-MCNC: 155 MG/DL — HIGH (ref 70–99)
GLUCOSE BLDC GLUCOMTR-MCNC: 158 MG/DL — HIGH (ref 70–99)
GLUCOSE SERPL-MCNC: 119 MG/DL — HIGH (ref 70–99)
HCT VFR BLD CALC: 28.7 % — LOW (ref 34.5–45)
HGB BLD-MCNC: 9.5 G/DL — LOW (ref 11.5–15.5)
MCHC RBC-ENTMCNC: 29.7 PG — SIGNIFICANT CHANGE UP (ref 27–34)
MCHC RBC-ENTMCNC: 33.1 GM/DL — SIGNIFICANT CHANGE UP (ref 32–36)
MCV RBC AUTO: 89.7 FL — SIGNIFICANT CHANGE UP (ref 80–100)
PLATELET # BLD AUTO: 300 K/UL — SIGNIFICANT CHANGE UP (ref 150–400)
POTASSIUM SERPL-MCNC: 4.1 MMOL/L — SIGNIFICANT CHANGE UP (ref 3.5–5.3)
POTASSIUM SERPL-SCNC: 4.1 MMOL/L — SIGNIFICANT CHANGE UP (ref 3.5–5.3)
RBC # BLD: 3.2 M/UL — LOW (ref 3.8–5.2)
RBC # FLD: 15.3 % — HIGH (ref 10.3–14.5)
SODIUM SERPL-SCNC: 144 MMOL/L — SIGNIFICANT CHANGE UP (ref 135–145)
WBC # BLD: 8.67 K/UL — SIGNIFICANT CHANGE UP (ref 3.8–10.5)
WBC # FLD AUTO: 8.67 K/UL — SIGNIFICANT CHANGE UP (ref 3.8–10.5)

## 2018-07-18 PROCEDURE — 93971 EXTREMITY STUDY: CPT | Mod: 26

## 2018-07-18 PROCEDURE — 99233 SBSQ HOSP IP/OBS HIGH 50: CPT

## 2018-07-18 RX ADMIN — Medication 48 MILLIGRAM(S): at 21:03

## 2018-07-18 RX ADMIN — Medication 25 MICROGRAM(S): at 05:51

## 2018-07-18 RX ADMIN — MEMANTINE HYDROCHLORIDE 10 MILLIGRAM(S): 10 TABLET ORAL at 21:04

## 2018-07-18 RX ADMIN — Medication 50 MILLIGRAM(S): at 21:03

## 2018-07-18 RX ADMIN — CEFTRIAXONE 100 GRAM(S): 500 INJECTION, POWDER, FOR SOLUTION INTRAMUSCULAR; INTRAVENOUS at 10:03

## 2018-07-18 RX ADMIN — CARVEDILOL PHOSPHATE 12.5 MILLIGRAM(S): 80 CAPSULE, EXTENDED RELEASE ORAL at 17:53

## 2018-07-18 RX ADMIN — CLOPIDOGREL BISULFATE 75 MILLIGRAM(S): 75 TABLET, FILM COATED ORAL at 12:07

## 2018-07-18 RX ADMIN — Medication 81 MILLIGRAM(S): at 12:06

## 2018-07-18 RX ADMIN — Medication 50 MILLIGRAM(S): at 05:51

## 2018-07-18 RX ADMIN — LIDOCAINE 1 PATCH: 4 CREAM TOPICAL at 06:52

## 2018-07-18 RX ADMIN — ATORVASTATIN CALCIUM 40 MILLIGRAM(S): 80 TABLET, FILM COATED ORAL at 21:03

## 2018-07-18 RX ADMIN — DONEPEZIL HYDROCHLORIDE 10 MILLIGRAM(S): 10 TABLET, FILM COATED ORAL at 21:03

## 2018-07-18 RX ADMIN — Medication 2: at 16:58

## 2018-07-18 RX ADMIN — AMLODIPINE BESYLATE 10 MILLIGRAM(S): 2.5 TABLET ORAL at 05:51

## 2018-07-18 RX ADMIN — CITALOPRAM 20 MILLIGRAM(S): 10 TABLET, FILM COATED ORAL at 12:07

## 2018-07-18 RX ADMIN — INSULIN GLARGINE 13 UNIT(S): 100 INJECTION, SOLUTION SUBCUTANEOUS at 12:10

## 2018-07-18 RX ADMIN — HEPARIN SODIUM 5000 UNIT(S): 5000 INJECTION INTRAVENOUS; SUBCUTANEOUS at 05:51

## 2018-07-18 RX ADMIN — HEPARIN SODIUM 5000 UNIT(S): 5000 INJECTION INTRAVENOUS; SUBCUTANEOUS at 17:53

## 2018-07-18 RX ADMIN — Medication 25 MILLIGRAM(S): at 12:07

## 2018-07-18 RX ADMIN — CARVEDILOL PHOSPHATE 12.5 MILLIGRAM(S): 80 CAPSULE, EXTENDED RELEASE ORAL at 05:51

## 2018-07-18 RX ADMIN — MEMANTINE HYDROCHLORIDE 10 MILLIGRAM(S): 10 TABLET ORAL at 05:51

## 2018-07-18 RX ADMIN — Medication 2: at 12:06

## 2018-07-19 ENCOUNTER — INBOUND DOCUMENT (OUTPATIENT)
Age: 78
End: 2018-07-19

## 2018-07-19 VITALS
DIASTOLIC BLOOD PRESSURE: 70 MMHG | OXYGEN SATURATION: 98 % | HEART RATE: 54 BPM | SYSTOLIC BLOOD PRESSURE: 158 MMHG | TEMPERATURE: 98 F | RESPIRATION RATE: 16 BRPM

## 2018-07-19 LAB
ANION GAP SERPL CALC-SCNC: 8 MMOL/L — SIGNIFICANT CHANGE UP (ref 5–17)
BUN SERPL-MCNC: 44 MG/DL — HIGH (ref 7–23)
CALCIUM SERPL-MCNC: 8.2 MG/DL — LOW (ref 8.4–10.5)
CHLORIDE SERPL-SCNC: 112 MMOL/L — HIGH (ref 96–108)
CO2 SERPL-SCNC: 23 MMOL/L — SIGNIFICANT CHANGE UP (ref 22–31)
CREAT SERPL-MCNC: 2.28 MG/DL — HIGH (ref 0.5–1.3)
GLUCOSE BLDC GLUCOMTR-MCNC: 129 MG/DL — HIGH (ref 70–99)
GLUCOSE BLDC GLUCOMTR-MCNC: 76 MG/DL — SIGNIFICANT CHANGE UP (ref 70–99)
GLUCOSE SERPL-MCNC: 80 MG/DL — SIGNIFICANT CHANGE UP (ref 70–99)
POTASSIUM SERPL-MCNC: 4.2 MMOL/L — SIGNIFICANT CHANGE UP (ref 3.5–5.3)
POTASSIUM SERPL-SCNC: 4.2 MMOL/L — SIGNIFICANT CHANGE UP (ref 3.5–5.3)
SODIUM SERPL-SCNC: 143 MMOL/L — SIGNIFICANT CHANGE UP (ref 135–145)

## 2018-07-19 PROCEDURE — 83550 IRON BINDING TEST: CPT

## 2018-07-19 PROCEDURE — 81001 URINALYSIS AUTO W/SCOPE: CPT

## 2018-07-19 PROCEDURE — 93458 L HRT ARTERY/VENTRICLE ANGIO: CPT | Mod: XU

## 2018-07-19 PROCEDURE — 87449 NOS EACH ORGANISM AG IA: CPT

## 2018-07-19 PROCEDURE — 99153 MOD SED SAME PHYS/QHP EA: CPT

## 2018-07-19 PROCEDURE — 82728 ASSAY OF FERRITIN: CPT

## 2018-07-19 PROCEDURE — 71045 X-RAY EXAM CHEST 1 VIEW: CPT

## 2018-07-19 PROCEDURE — C1894: CPT

## 2018-07-19 PROCEDURE — 76937 US GUIDE VASCULAR ACCESS: CPT

## 2018-07-19 PROCEDURE — 80048 BASIC METABOLIC PNL TOTAL CA: CPT

## 2018-07-19 PROCEDURE — 83036 HEMOGLOBIN GLYCOSYLATED A1C: CPT

## 2018-07-19 PROCEDURE — C1769: CPT

## 2018-07-19 PROCEDURE — 99152 MOD SED SAME PHYS/QHP 5/>YRS: CPT

## 2018-07-19 PROCEDURE — 84100 ASSAY OF PHOSPHORUS: CPT

## 2018-07-19 PROCEDURE — 97162 PT EVAL MOD COMPLEX 30 MIN: CPT

## 2018-07-19 PROCEDURE — 87086 URINE CULTURE/COLONY COUNT: CPT

## 2018-07-19 PROCEDURE — C9600: CPT | Mod: RC

## 2018-07-19 PROCEDURE — 99239 HOSP IP/OBS DSCHRG MGMT >30: CPT

## 2018-07-19 PROCEDURE — 87040 BLOOD CULTURE FOR BACTERIA: CPT

## 2018-07-19 PROCEDURE — 87324 CLOSTRIDIUM AG IA: CPT

## 2018-07-19 PROCEDURE — C1887: CPT

## 2018-07-19 PROCEDURE — 80053 COMPREHEN METABOLIC PANEL: CPT

## 2018-07-19 PROCEDURE — 87186 SC STD MICRODIL/AGAR DIL: CPT

## 2018-07-19 PROCEDURE — C1874: CPT

## 2018-07-19 PROCEDURE — 82962 GLUCOSE BLOOD TEST: CPT

## 2018-07-19 PROCEDURE — 93005 ELECTROCARDIOGRAM TRACING: CPT

## 2018-07-19 PROCEDURE — 93971 EXTREMITY STUDY: CPT

## 2018-07-19 PROCEDURE — 83735 ASSAY OF MAGNESIUM: CPT

## 2018-07-19 PROCEDURE — C1725: CPT

## 2018-07-19 PROCEDURE — 85027 COMPLETE CBC AUTOMATED: CPT

## 2018-07-19 RX ORDER — ATORVASTATIN CALCIUM 80 MG/1
1 TABLET, FILM COATED ORAL
Qty: 0 | Refills: 0 | COMMUNITY
Start: 2018-07-19

## 2018-07-19 RX ORDER — ATORVASTATIN CALCIUM 80 MG/1
1 TABLET, FILM COATED ORAL
Qty: 30 | Refills: 0 | OUTPATIENT
Start: 2018-07-19 | End: 2018-08-17

## 2018-07-19 RX ORDER — CLOPIDOGREL BISULFATE 75 MG/1
1 TABLET, FILM COATED ORAL
Qty: 30 | Refills: 0 | OUTPATIENT
Start: 2018-07-19 | End: 2018-08-17

## 2018-07-19 RX ORDER — ACETAMINOPHEN 500 MG
2 TABLET ORAL
Qty: 0 | Refills: 0 | COMMUNITY
Start: 2018-07-19

## 2018-07-19 RX ADMIN — Medication 25 MILLIGRAM(S): at 12:49

## 2018-07-19 RX ADMIN — INSULIN GLARGINE 13 UNIT(S): 100 INJECTION, SOLUTION SUBCUTANEOUS at 12:48

## 2018-07-19 RX ADMIN — CARVEDILOL PHOSPHATE 12.5 MILLIGRAM(S): 80 CAPSULE, EXTENDED RELEASE ORAL at 05:43

## 2018-07-19 RX ADMIN — MEMANTINE HYDROCHLORIDE 10 MILLIGRAM(S): 10 TABLET ORAL at 05:44

## 2018-07-19 RX ADMIN — Medication 81 MILLIGRAM(S): at 11:21

## 2018-07-19 RX ADMIN — AMLODIPINE BESYLATE 10 MILLIGRAM(S): 2.5 TABLET ORAL at 05:44

## 2018-07-19 RX ADMIN — HEPARIN SODIUM 5000 UNIT(S): 5000 INJECTION INTRAVENOUS; SUBCUTANEOUS at 05:46

## 2018-07-19 RX ADMIN — CLOPIDOGREL BISULFATE 75 MILLIGRAM(S): 75 TABLET, FILM COATED ORAL at 11:21

## 2018-07-19 RX ADMIN — CEFTRIAXONE 100 GRAM(S): 500 INJECTION, POWDER, FOR SOLUTION INTRAMUSCULAR; INTRAVENOUS at 11:12

## 2018-07-19 RX ADMIN — Medication 50 MILLIGRAM(S): at 05:44

## 2018-07-19 RX ADMIN — CITALOPRAM 20 MILLIGRAM(S): 10 TABLET, FILM COATED ORAL at 11:22

## 2018-07-19 RX ADMIN — Medication 25 MICROGRAM(S): at 05:43

## 2018-07-19 RX ADMIN — LIDOCAINE 1 PATCH: 4 CREAM TOPICAL at 11:21

## 2018-07-19 NOTE — PROGRESS NOTE ADULT - PROVIDER SPECIALTY LIST ADULT
Hospitalist
Hospitalist
Internal Medicine
Nephrology
Internal Medicine

## 2018-07-19 NOTE — PROGRESS NOTE ADULT - SUBJECTIVE AND OBJECTIVE BOX
Carl Blum MD  Division of Hospital Medicine  Pager [phone]      MARTINEZ TREJO  78y  Female      Patient is a 78y old  Female who presents with a chief complaint of dyspnea    INTERVAL HPI/OVERNIGHT EVENTS:  Seen at bedside. Denies pain. No SOB/palpitations.       REVIEW OF SYSTEMS: 14 point ROS negative unless listed above    T(C): 36.9 (07-19-18 @ 05:02), Max: 36.9 (07-19-18 @ 05:02)  HR: 56 (07-19-18 @ 09:04) (56 - 67)  BP: 131/71 (07-19-18 @ 09:04) (131/71 - 164/76)  RR: 18 (07-19-18 @ 05:02) (17 - 18)  SpO2: 96% (07-19-18 @ 09:04) (96% - 98%)  Wt(kg): --Vital Signs Last 24 Hrs  T(C): 36.9 (19 Jul 2018 05:02), Max: 36.9 (19 Jul 2018 05:02)  T(F): 98.4 (19 Jul 2018 05:02), Max: 98.4 (19 Jul 2018 05:02)  HR: 56 (19 Jul 2018 09:04) (56 - 67)  BP: 131/71 (19 Jul 2018 09:04) (131/71 - 164/76)  BP(mean): --  RR: 18 (19 Jul 2018 05:02) (17 - 18)  SpO2: 96% (19 Jul 2018 09:04) (96% - 98%)    PHYSICAL EXAM:  GENERAL: NAD, well-groomed, well-developed  HEAD:  Atraumatic, Normocephalic  NECK: Supple, No JVD  CHEST/LUNG: Clear to percussion bilaterally; No rales, rhonchi, wheezing, or rubs  HEART: Regular rate and rhythm; No murmurs, rubs, or gallops  ABDOMEN: Soft, Nontender, Nondistended; Bowel sounds present  EXTREMITIES:  2+ Peripheral Pulses, No clubbing, cyanosis, or edema. Pain to palpation in medical aspect of right thigh  Skin: ecchymosis in right groin  PSYCH: Alert & Oriented x2 (person, place)    Consultant(s) Notes Reviewed:  [x ] YES  [ ] NO  Care Discussed with Consultants/Other Providers [ x] YES  [ ] NO    LABS:                        9.5    8.67  )-----------( 300      ( 18 Jul 2018 08:23 )             28.7     07-19    143  |  112<H>  |  44<H>  ----------------------------<  80  4.2   |  23  |  2.28<H>    Ca    8.2<L>      19 Jul 2018 06:42          CAPILLARY BLOOD GLUCOSE      POCT Blood Glucose.: 129 mg/dL (19 Jul 2018 11:29)  POCT Blood Glucose.: 76 mg/dL (19 Jul 2018 07:59)  POCT Blood Glucose.: 142 mg/dL (18 Jul 2018 21:01)  POCT Blood Glucose.: 158 mg/dL (18 Jul 2018 16:30)            RADIOLOGY & ADDITIONAL TESTS:    Imaging Personally Reviewed:  [x ] YES  [ ] NO
Carl Blum MD  Division of Hospital Medicine  Pager [phone]      MARTINEZ TREJO  78y  Female      Patient is a 78y old  Female who presents with a chief complaint of     INTERVAL HPI/OVERNIGHT EVENTS:  Seen at bedside. Daughter present. Still complaining of right lower extremity pain. No chest pain, SOB, palpitations.       REVIEW OF SYSTEMS: 14 point ROS negative unless listed above    T(C): 37.1 (07-16-18 @ 11:37), Max: 37.9 (07-15-18 @ 14:07)  HR: 62 (07-16-18 @ 11:37) (61 - 70)  BP: 145/74 (07-16-18 @ 11:37) (115/73 - 178/65)  RR: 18 (07-16-18 @ 11:37) (18 - 18)  SpO2: 93% (07-16-18 @ 11:37) (92% - 95%)  Wt(kg): --Vital Signs Last 24 Hrs  T(C): 37.1 (16 Jul 2018 11:37), Max: 37.9 (15 Jul 2018 14:07)  T(F): 98.8 (16 Jul 2018 11:37), Max: 100.3 (15 Jul 2018 14:07)  HR: 62 (16 Jul 2018 11:37) (61 - 70)  BP: 145/74 (16 Jul 2018 11:37) (115/73 - 178/65)  BP(mean): --  RR: 18 (16 Jul 2018 11:37) (18 - 18)  SpO2: 93% (16 Jul 2018 11:37) (92% - 95%)    PHYSICAL EXAM:  GENERAL: NAD, well-groomed, well-developed  HEAD:  Atraumatic, Normocephalic  NECK: Supple, No JVD  CHEST/LUNG: Clear to percussion bilaterally; No rales, rhonchi, wheezing, or rubs  HEART: Regular rate and rhythm; No murmurs, rubs, or gallops  ABDOMEN: Soft, Nontender, Nondistended; Bowel sounds present  EXTREMITIES:  2+ Peripheral Pulses, No clubbing, cyanosis, or edema  PSYCH: Alert & Oriented x2 (person, place)    Consultant(s) Notes Reviewed:  [x ] YES  [ ] NO  Care Discussed with Consultants/Other Providers [ x] YES  [ ] NO    LABS:                        10.0   12.1  )-----------( 237      ( 16 Jul 2018 10:34 )             29.7     07-16    142  |  106  |  34<H>  ----------------------------<  90  4.0   |  20<L>  |  2.40<H>    Ca    8.0<L>      16 Jul 2018 10:35  Phos  2.4     07-15  Mg     1.8     07-15    TPro  7.1  /  Alb  3.3  /  TBili  0.2  /  DBili  x   /  AST  17  /  ALT  8<L>  /  AlkPhos  63  07-14        CAPILLARY BLOOD GLUCOSE      POCT Blood Glucose.: 133 mg/dL (16 Jul 2018 11:43)  POCT Blood Glucose.: 80 mg/dL (16 Jul 2018 07:54)  POCT Blood Glucose.: 103 mg/dL (15 Jul 2018 21:38)  POCT Blood Glucose.: 90 mg/dL (15 Jul 2018 16:41)            RADIOLOGY & ADDITIONAL TESTS:    Imaging Personally Reviewed:  [x ] YES  [ ] NO
Carl Blum MD  Division of Hospital Medicine  Pager [phone]      MARTINEZ TREJO  78y  Female      Patient is a 78y old  Female who presents with a chief complaint of     INTERVAL HPI/OVERNIGHT EVENTS:  Tolerated cath well yesterday. Lying in bed. Reports no pain. No chest pain/SOB      REVIEW OF SYSTEMS: 14 point ROS negative unless listed above    T(C): 36.8 (07-17-18 @ 08:06), Max: 36.8 (07-16-18 @ 23:00)  HR: 60 (07-17-18 @ 08:06) (60 - 78)  BP: 151/55 (07-17-18 @ 08:06) (147/65 - 178/66)  RR: 16 (07-17-18 @ 08:06) (16 - 18)  SpO2: 95% (07-17-18 @ 08:06) (93% - 98%)  Wt(kg): --Vital Signs Last 24 Hrs  T(C): 36.8 (17 Jul 2018 08:06), Max: 36.8 (16 Jul 2018 23:00)  T(F): 98.3 (17 Jul 2018 08:06), Max: 98.3 (17 Jul 2018 08:06)  HR: 60 (17 Jul 2018 08:06) (60 - 78)  BP: 151/55 (17 Jul 2018 08:06) (147/65 - 178/66)  BP(mean): --  RR: 16 (17 Jul 2018 08:06) (16 - 18)  SpO2: 95% (17 Jul 2018 08:06) (93% - 98%)    PHYSICAL EXAM:  GENERAL: NAD, well-groomed, well-developed  HEAD:  Atraumatic, Normocephalic  NECK: Supple, No JVD  CHEST/LUNG: Clear to percussion bilaterally; No rales, rhonchi, wheezing, or rubs  HEART: Regular rate and rhythm; No murmurs, rubs, or gallops  ABDOMEN: Soft, Nontender, Nondistended; Bowel sounds present  EXTREMITIES:  2+ Peripheral Pulses, No clubbing, cyanosis, or edema. Pain to palpation in medical aspect of right thigh  Skin: ecchymosis in right groin  PSYCH: Alert & Oriented x2 (person, place)    Consultant(s) Notes Reviewed:  [x ] YES  [ ] NO  Care Discussed with Consultants/Other Providers [ x] YES  [ ] NO    LABS:                        9.0    10.32 )-----------( 291      ( 17 Jul 2018 11:02 )             28.2     07-17    143  |  106  |  36<H>  ----------------------------<  105<H>  4.0   |  22  |  2.23<H>    Ca    7.8<L>      17 Jul 2018 07:30  Phos  3.5     07-17  Mg     2.1     07-17          CAPILLARY BLOOD GLUCOSE      POCT Blood Glucose.: 199 mg/dL (17 Jul 2018 11:57)  POCT Blood Glucose.: 116 mg/dL (17 Jul 2018 07:52)  POCT Blood Glucose.: 126 mg/dL (16 Jul 2018 21:18)            RADIOLOGY & ADDITIONAL TESTS:    Imaging Personally Reviewed:  [x ] YES  [ ] NO
Carl Blum MD  Division of Hospital Medicine  Pager [phone]      MARTINEZ TREJO  78y  Female      Patient is a 78y old  Female who presents with a chief complaint of     INTERVAL HPI/OVERNIGHT EVENTS:  Seen at bedside. No pain in lower extremity this am. Denied chest pain, SOB or palpitations      REVIEW OF SYSTEMS: 14 point ROS negative unless listed above    T(C): 36.5 (07-18-18 @ 12:00), Max: 36.8 (07-17-18 @ 21:25)  HR: 60 (07-18-18 @ 12:00) (57 - 84)  BP: 118/68 (07-18-18 @ 12:00) (118/68 - 168/68)  RR: 18 (07-18-18 @ 12:00) (17 - 18)  SpO2: 96% (07-18-18 @ 12:00) (94% - 97%)  Wt(kg): --Vital Signs Last 24 Hrs  T(C): 36.5 (18 Jul 2018 12:00), Max: 36.8 (17 Jul 2018 21:25)  T(F): 97.7 (18 Jul 2018 12:00), Max: 98.3 (17 Jul 2018 21:25)  HR: 60 (18 Jul 2018 12:00) (57 - 84)  BP: 118/68 (18 Jul 2018 12:00) (118/68 - 168/68)  BP(mean): --  RR: 18 (18 Jul 2018 12:00) (17 - 18)  SpO2: 96% (18 Jul 2018 12:00) (94% - 97%)    PHYSICAL EXAM:  GENERAL: NAD, well-groomed, well-developed  HEAD:  Atraumatic, Normocephalic  NECK: Supple, No JVD  CHEST/LUNG: Clear to percussion bilaterally; No rales, rhonchi, wheezing, or rubs  HEART: Regular rate and rhythm; No murmurs, rubs, or gallops  ABDOMEN: Soft, Nontender, Nondistended; Bowel sounds present  EXTREMITIES:  2+ Peripheral Pulses, No clubbing, cyanosis, or edema. Pain to palpation in medical aspect of right thigh  Skin: ecchymosis in right groin  PSYCH: Alert & Oriented x2 (person, place)    Consultant(s) Notes Reviewed:  [x ] YES  [ ] NO  Care Discussed with Consultants/Other Providers [ x] YES  [ ] NO    LABS:                        9.5    8.67  )-----------( 300      ( 18 Jul 2018 08:23 )             28.7     07-18    144  |  109<H>  |  43<H>  ----------------------------<  119<H>  4.1   |  21<L>  |  2.28<H>    Ca    8.0<L>      18 Jul 2018 06:09  Phos  3.5     07-17  Mg     2.1     07-17          CAPILLARY BLOOD GLUCOSE      POCT Blood Glucose.: 155 mg/dL (18 Jul 2018 11:40)  POCT Blood Glucose.: 119 mg/dL (18 Jul 2018 07:50)  POCT Blood Glucose.: 156 mg/dL (17 Jul 2018 21:35)  POCT Blood Glucose.: 105 mg/dL (17 Jul 2018 16:35)            RADIOLOGY & ADDITIONAL TESTS:    Imaging Personally Reviewed:  [x ] YES  [ ] NO
[facility] - [place] / Hector GOEL /Cain/ AMANDO Romo/ AMANDO Krause/ Maxim Joiner / KATELYN Njjacquelynu  ---------------------------------------------------------------------------------------------------------------  seen and examined today for Chronic Kidney Disease Stage 3-4  Interval : Serum Creatinine pending today  VITALS:  T(F): 98 (07-14-18 @ 03:56), Max: 98.2 (07-13-18 @ 20:15)  HR: 62 (07-14-18 @ 03:56)  BP: 172/68 (07-14-18 @ 03:56)  RR: 16 (07-14-18 @ 03:56)  SpO2: 95% (07-14-18 @ 03:56)  Wt(kg): --    07-13 @ 07:01  -  07-14 @ 07:00  --------------------------------------------------------  IN: 360 mL / OUT: 800 mL / NET: -440 mL    Physical Exam :-  Constitutional: NAD  Neck: Supple.  Respiratory: Bilateral equal breath sounds, no Crackles present.  Cardiovascular: S1, S2 normal, positive Murmur  Gastrointestinal: Bowel Sounds present, soft, non tender.  Extremities: no Edema Feet  Neurological: Alert and Oriented x 3, no focal deficits  Psychiatric: Normal mood, normal affect  Data:-  Allergies :   No Known Allergies    Hospital Medications:   MEDICATIONS  (STANDING):  acetylcysteine  Oral Solution 1200 milliGRAM(s) Oral every 12 hours  amLODIPine   Tablet 10 milliGRAM(s) Oral daily  aspirin enteric coated 81 milliGRAM(s) Oral daily  atorvastatin 40 milliGRAM(s) Oral at bedtime  carvedilol 12.5 milliGRAM(s) Oral every 12 hours  citalopram 20 milliGRAM(s) Oral daily  clopidogrel Tablet 75 milliGRAM(s) Oral daily  dextrose 5%. 1000 milliLiter(s) (50 mL/Hr) IV Continuous <Continuous>  dextrose 50% Injectable 12.5 Gram(s) IV Push once  dextrose 50% Injectable 25 Gram(s) IV Push once  dextrose 50% Injectable 25 Gram(s) IV Push once  donepezil 10 milliGRAM(s) Oral at bedtime  fenofibrate Tablet 48 milliGRAM(s) Oral at bedtime  hydrALAZINE 25 milliGRAM(s) Oral <User Schedule>  hydrALAZINE 50 milliGRAM(s) Oral <User Schedule>  insulin glargine Injectable (LANTUS) 13 Unit(s) SubCutaneous <User Schedule>  insulin lispro (HumaLOG) corrective regimen sliding scale   SubCutaneous three times a day before meals  insulin lispro (HumaLOG) corrective regimen sliding scale   SubCutaneous at bedtime  levothyroxine 25 MICROGram(s) Oral daily  memantine 10 milliGRAM(s) Oral two times a day  sodium chloride 0.9%. 1000 milliLiter(s) (60 mL/Hr) IV Continuous <Continuous>     07-13    145  |  107  |  39<H>  ----------------------------<  100<H>  4.4   |  24  |  1.97<H>    Ca    8.9      13 Jul 2018 10:09    TPro  7.9  /  Alb  4.1  /  TBili  0.2  /  DBili      /  AST  16  /  ALT  9<L>  /  AlkPhos  65  07-13    Creatinine Trend: 1.97 <--                        10.9   10.0  )-----------( 272      ( 13 Jul 2018 10:09 )             34.1
Patient is a 78y old  Female who presents with a chief complaint of dyspnea.        SUBJECTIVE / OVERNIGHT EVENTS: Patient seen and examined. Reports no complaints today. However, patient w/ T 100.1 this AM and 100.3 this afternoon. Denies dysuria, polyuria, SOB, cough, abdominal pain, N/V/D.    REVIEW OF SYSTEMS    General: Low grade fever  	  Ophthalmologic: No change in vision    Respiratory and Thorax: No SOB or cough  	  Cardiovascular: No chest pain, palpitations, or LE edema    Gastrointestinal: No abdominal pain, nausea, vomiting, or diarrhea    Genitourinary: No dysuria or polyuria    MEDICATIONS  (STANDING):  amLODIPine   Tablet 10 milliGRAM(s) Oral daily  aspirin enteric coated 81 milliGRAM(s) Oral daily  atorvastatin 40 milliGRAM(s) Oral at bedtime  carvedilol 12.5 milliGRAM(s) Oral every 12 hours  cefTRIAXone   IVPB      citalopram 20 milliGRAM(s) Oral daily  clopidogrel Tablet 75 milliGRAM(s) Oral daily  dextrose 5%. 1000 milliLiter(s) (50 mL/Hr) IV Continuous <Continuous>  dextrose 50% Injectable 12.5 Gram(s) IV Push once  dextrose 50% Injectable 25 Gram(s) IV Push once  dextrose 50% Injectable 25 Gram(s) IV Push once  donepezil 10 milliGRAM(s) Oral at bedtime  fenofibrate Tablet 48 milliGRAM(s) Oral at bedtime  heparin  Injectable 5000 Unit(s) SubCutaneous every 12 hours  hydrALAZINE 25 milliGRAM(s) Oral <User Schedule>  hydrALAZINE 50 milliGRAM(s) Oral <User Schedule>  insulin glargine Injectable (LANTUS) 13 Unit(s) SubCutaneous <User Schedule>  insulin lispro (HumaLOG) corrective regimen sliding scale   SubCutaneous three times a day before meals  insulin lispro (HumaLOG) corrective regimen sliding scale   SubCutaneous at bedtime  levothyroxine 25 MICROGram(s) Oral daily  memantine 10 milliGRAM(s) Oral two times a day    MEDICATIONS  (PRN):  dextrose 40% Gel 15 Gram(s) Oral once PRN Blood Glucose LESS THAN 70 milliGRAM(s)/deciliter  glucagon  Injectable 1 milliGRAM(s) IntraMuscular once PRN Glucose LESS THAN 70 milligrams/deciliter      I&O's Summary    2018 07:01  -  15 Jul 2018 07:00  --------------------------------------------------------  IN: 240 mL / OUT: 100 mL / NET: 140 mL    15 Jul 2018 07:01  -  15 Jul 2018 16:41  --------------------------------------------------------  IN: 480 mL / OUT: 0 mL / NET: 480 mL    CAPILLARY BLOOD GLUCOSE      POCT Blood Glucose.: 104 mg/dL (15 Jul 2018 12:06)  POCT Blood Glucose.: 84 mg/dL (15 Jul 2018 07:59)  POCT Blood Glucose.: 140 mg/dL (2018 21:39)  POCT Blood Glucose.: 160 mg/dL (2018 16:52)      Vital Signs Last 24 Hrs  T(C): 37.9 (15 Jul 2018 14:07), Max: 37.9 (15 Jul 2018 14:07)  T(F): 100.3 (15 Jul 2018 14:07), Max: 100.3 (15 Jul 2018 14:07)  HR: 68 (15 Jul 2018 14:07) (65 - 72)  BP: 178/65 (15 Jul 2018 14:07) (162/64 - 178/71)  BP(mean): --  RR: 18 (15 Jul 2018 14:07) (18 - 18)  SpO2: 94% (15 Jul 2018 14:07) (93% - 94%)    PHYSICAL EXAM:     Constitutional: Elderly, NAD, lying in bed, appears stated age    Eyes: EOMI, PERRLA, clear conjunctiva    ENMT: No pharyngeal erythema, mucus membranes moist    Neck: No JVD    Back: No spinal tenderness or kyphosis    Respiratory: Lungs clear to auscultation     Cardiovascular: Regular rate and rhythm, S1S2+, no murmurs appreciated. No LE edema    Gastrointestinal: Soft, non-tender, non-distended, bowel sounds positive all four quadrants    Extremities: no clubbing or cyanosis    Vascular: 2+ pulses radially and dorsalis pedis.     Neurological: A+Ox2 (name and place). RUE 4/5, no other abnormalities on neuro exam    Skin: Warm and dry.  No rashes. R groin with no fluid collection, nontender to palpation.     Lymph Nodes: No cervical lymphadenopathy    Musculoskeletal: No joint swelling or erythema      LABS:                     (07-15 @ 08:28)                      10.5  13.49 )-----------( 289                 31.9    Neutrophils = 9.95 (73.8%)  Lymphocytes = 2.27 (16.8%)  Eosinophils = 0.26 (1.9%)  Basophils = 0.03 (0.2%)  Monocytes = 0.94 (7.0%)  Bands = --%    07-15    145  |  110<H>  |  31<H>  ----------------------------<  58<L>  4.1   |  22  |  1.90<H>    Ca    8.3<L>      15 Jul 2018 06:35  Phos  2.4     -15  Mg     1.8     -15    TPro  7.1  /  Alb  3.3  /  TBili  0.2  /  DBili  x   /  AST  17  /  ALT  8<L>  /  AlkPhos  63  07-14    Urinalysis Basic - ( 2018 21:06 )    Color: Red / Appearance: Turbid / S.019 / pH: x  Gluc: x / Ketone: Negative  / Bili: Negative / Urobili: Negative   Blood: x / Protein: 300 mg/dL / Nitrite: Negative   Leuk Esterase: Moderate / RBC: >50 /HPF / WBC 10-25 /HPF   Sq Epi: x / Non Sq Epi: Occasional /HPF / Bacteria: Moderate /HPF      RADIOLOGY & ADDITIONAL TESTS:    Imaging Personally Reviewed: [x]  Consultant(s) Notes Reviewed:  nephrology  Care Discussed with Consultants/Other Providers: [x]
Patient is a 78y old  Female who presents with a chief complaint of dyspnea.        SUBJECTIVE / OVERNIGHT EVENTS: Patient seen and examined. She feels well today, denies pain. Patient reports site of cath insertion for procedure feels well. She denies dysuria and polyuria.     MEDICATIONS  (STANDING):  acetylcysteine  Oral Solution 1200 milliGRAM(s) Oral every 12 hours  amLODIPine   Tablet 10 milliGRAM(s) Oral daily  aspirin enteric coated 81 milliGRAM(s) Oral daily  atorvastatin 40 milliGRAM(s) Oral at bedtime  carvedilol 12.5 milliGRAM(s) Oral every 12 hours  citalopram 20 milliGRAM(s) Oral daily  clopidogrel Tablet 75 milliGRAM(s) Oral daily  dextrose 5%. 1000 milliLiter(s) (50 mL/Hr) IV Continuous <Continuous>  dextrose 50% Injectable 12.5 Gram(s) IV Push once  dextrose 50% Injectable 25 Gram(s) IV Push once  dextrose 50% Injectable 25 Gram(s) IV Push once  donepezil 10 milliGRAM(s) Oral at bedtime  fenofibrate Tablet 48 milliGRAM(s) Oral at bedtime  hydrALAZINE 25 milliGRAM(s) Oral <User Schedule>  hydrALAZINE 50 milliGRAM(s) Oral <User Schedule>  insulin glargine Injectable (LANTUS) 13 Unit(s) SubCutaneous <User Schedule>  insulin lispro (HumaLOG) corrective regimen sliding scale   SubCutaneous three times a day before meals  insulin lispro (HumaLOG) corrective regimen sliding scale   SubCutaneous at bedtime  levothyroxine 25 MICROGram(s) Oral daily  memantine 10 milliGRAM(s) Oral two times a day  sodium chloride 0.9%. 1000 milliLiter(s) (60 mL/Hr) IV Continuous <Continuous>    MEDICATIONS  (PRN):  dextrose 40% Gel 15 Gram(s) Oral once PRN Blood Glucose LESS THAN 70 milliGRAM(s)/deciliter  glucagon  Injectable 1 milliGRAM(s) IntraMuscular once PRN Glucose LESS THAN 70 milligrams/deciliter      REVIEW OF SYSTEMS      General: No fevers, chills  	  Ophthalmologic: No change in vision    Respiratory and Thorax: No SOB or cough  	  Cardiovascular: No chest pain, palpitations, or LE edema    Gastrointestinal: No abdominal pain, nausea, vomiting, or diarrhea    Genitourinary: No dysuria or polyuria    	    CAPILLARY BLOOD GLUCOSE      POCT Blood Glucose.: 160 mg/dL (2018 16:52)  POCT Blood Glucose.: 98 mg/dL (2018 11:43)  POCT Blood Glucose.: 94 mg/dL (2018 07:49)  POCT Blood Glucose.: 136 mg/dL (2018 21:08)  POCT Blood Glucose.: 181 mg/dL (2018 17:58)    I&O's Summary    2018 07:01  -  2018 07:00  --------------------------------------------------------  IN: 360 mL / OUT: 800 mL / NET: -440 mL      Vital Signs Last 24 Hrs  T(C): 36.6 (2018 12:27), Max: 36.8 (2018 20:15)  T(F): 97.8 (2018 12:27), Max: 98.2 (2018 20:15)  HR: 54 (2018 12:27) (54 - 63)  BP: 160/70 (2018 12:57) (129/48 - 180/72)  BP(mean): --  RR: 18 (2018 12:27) (16 - 18)  SpO2: 94% (2018 12:27) (94% - 97%)    PHYSICAL EXAM:    Constitutional: Elderly, NAD, lying in bed, appears stated age    Eyes: EOMI, PERRLA, clear conjunctiva    ENMT: No pharyngeal erythema, mucus membranes moist    Neck: No JVD    Back: No spinal tenderness or kyphosis    Respiratory: Lungs clear to auscultation     Cardiovascular: Regular rate and rhythm, S1S2+, no murmurs appreciated.  No LE edema    Gastrointestinal: Soft, non-tender, non-distended, bowel sounds positive all four quadrants    Extremities: no clubbing or cyanosis    Vascular: 2+ pulses radially and dorsalis pedis.     Neurological: A+Ox2 (name and place). RUE 4/5, no other abnormalities on neuro exam    Skin: Warm and dry.  No rashes. R groin with no fluid collection, nontender to palpation.     Lymph Nodes: No cervical lymphadenopathy    Musculoskeletal: No joint swelling or erythema      LABS:                        10.9   10.0  )-----------( 272      ( 2018 10:09 )             34.1     07-14    143  |  108  |  30<H>  ----------------------------<  185<H>  3.9   |  22  |  1.85<H>    Ca    8.4      2018 14:54    TPro  7.1  /  Alb  3.3  /  TBili  0.2  /  DBili  x   /  AST  17  /  ALT  8<L>  /  AlkPhos  63  07-14          Urinalysis Basic - ( 2018 21:06 )    Color: Red / Appearance: Turbid / S.019 / pH: x  Gluc: x / Ketone: Negative  / Bili: Negative / Urobili: Negative   Blood: x / Protein: 300 mg/dL / Nitrite: Negative   Leuk Esterase: Moderate / RBC: >50 /HPF / WBC 10-25 /HPF   Sq Epi: x / Non Sq Epi: Occasional /HPF / Bacteria: Moderate /HPF      RADIOLOGY & ADDITIONAL TESTS:    Imaging Personally Reviewed: [x]  Consultant(s) Notes Reviewed:  nephrology  Care Discussed with Consultants/Other Providers: [x]
Patient seen and examined  no complaints    No Known Allergies    Hospital Medications:   MEDICATIONS  (STANDING):  acetaminophen   Tablet. 650 milliGRAM(s) Oral every 6 hours  amLODIPine   Tablet 10 milliGRAM(s) Oral daily  aspirin enteric coated 81 milliGRAM(s) Oral daily  atorvastatin 40 milliGRAM(s) Oral at bedtime  carvedilol 12.5 milliGRAM(s) Oral every 12 hours  cefTRIAXone   IVPB      cefTRIAXone   IVPB 1 Gram(s) IV Intermittent every 24 hours  citalopram 20 milliGRAM(s) Oral daily  clopidogrel Tablet 75 milliGRAM(s) Oral daily  dextrose 5%. 1000 milliLiter(s) (50 mL/Hr) IV Continuous <Continuous>  dextrose 50% Injectable 12.5 Gram(s) IV Push once  dextrose 50% Injectable 25 Gram(s) IV Push once  dextrose 50% Injectable 25 Gram(s) IV Push once  donepezil 10 milliGRAM(s) Oral at bedtime  fenofibrate Tablet 48 milliGRAM(s) Oral at bedtime  heparin  Injectable 5000 Unit(s) SubCutaneous every 12 hours  hydrALAZINE 25 milliGRAM(s) Oral <User Schedule>  hydrALAZINE 50 milliGRAM(s) Oral <User Schedule>  insulin glargine Injectable (LANTUS) 13 Unit(s) SubCutaneous <User Schedule>  insulin lispro (HumaLOG) corrective regimen sliding scale   SubCutaneous three times a day before meals  insulin lispro (HumaLOG) corrective regimen sliding scale   SubCutaneous at bedtime  levothyroxine 25 MICROGram(s) Oral daily  lidocaine   Patch 1 Patch Transdermal daily  memantine 10 milliGRAM(s) Oral two times a day        VITALS:  T(F): 98.3 (18 @ 08:06), Max: 98.3 (18 @ 08:06)  HR: 60 (18 @ 08:06)  BP: 151/55 (18 @ 08:06)  RR: 16 (18 @ 08:06)  SpO2: 95% (18 @ 08:06)  Wt(kg): --     @ 07:01  -   @ 07:00  --------------------------------------------------------  IN: 240 mL / OUT: 250 mL / NET: -10 mL     @ 07:01  -   @ 14:59  --------------------------------------------------------  IN: 470 mL / OUT: 0 mL / NET: 470 mL        Physical Exam :-  Constitutional: NAD  Neck: Supple.  Respiratory: Bilateral equal breath sounds, no Crackles present.  Cardiovascular: S1, S2 normal, positive Murmur  Gastrointestinal: Bowel Sounds present, soft, non tender.  Extremities: no Edema Feet  Neurological: Alert and Oriented x 3, no focal deficits  Psychiatric: Normal mood, normal affect    LABS:      143  |  106  |  36<H>  ----------------------------<  105<H>  4.0   |  22  |  2.23<H>    Ca    7.8<L>      2018 07:30  Phos  3.5       Mg     2.1           Creatinine Trend: 2.23 <--, 2.40 <--, 1.90 <--, 1.85 <--, 1.97 <--                        9.0    10.32 )-----------( 291      ( 2018 11:02 )             28.2     Urine Studies:  Urinalysis Basic - ( 2018 21:06 )    Color: Red / Appearance: Turbid / S.019 / pH:   Gluc:  / Ketone: Negative  / Bili: Negative / Urobili: Negative   Blood:  / Protein: 300 mg/dL / Nitrite: Negative   Leuk Esterase: Moderate / RBC: >50 /HPF / WBC 10-25 /HPF   Sq Epi:  / Non Sq Epi: Occasional /HPF / Bacteria: Moderate /HPF        RADIOLOGY & ADDITIONAL STUDIES:
Patient seen and examined  no complaints    No Known Allergies    Hospital Medications:   MEDICATIONS  (STANDING):  acetaminophen   Tablet. 650 milliGRAM(s) Oral every 6 hours  amLODIPine   Tablet 10 milliGRAM(s) Oral daily  aspirin enteric coated 81 milliGRAM(s) Oral daily  atorvastatin 40 milliGRAM(s) Oral at bedtime  carvedilol 12.5 milliGRAM(s) Oral every 12 hours  cefTRIAXone   IVPB      cefTRIAXone   IVPB 1 Gram(s) IV Intermittent every 24 hours  citalopram 20 milliGRAM(s) Oral daily  clopidogrel Tablet 75 milliGRAM(s) Oral daily  dextrose 5%. 1000 milliLiter(s) (50 mL/Hr) IV Continuous <Continuous>  dextrose 50% Injectable 12.5 Gram(s) IV Push once  dextrose 50% Injectable 25 Gram(s) IV Push once  dextrose 50% Injectable 25 Gram(s) IV Push once  donepezil 10 milliGRAM(s) Oral at bedtime  fenofibrate Tablet 48 milliGRAM(s) Oral at bedtime  heparin  Injectable 5000 Unit(s) SubCutaneous every 12 hours  hydrALAZINE 25 milliGRAM(s) Oral <User Schedule>  hydrALAZINE 50 milliGRAM(s) Oral <User Schedule>  insulin glargine Injectable (LANTUS) 13 Unit(s) SubCutaneous <User Schedule>  insulin lispro (HumaLOG) corrective regimen sliding scale   SubCutaneous three times a day before meals  insulin lispro (HumaLOG) corrective regimen sliding scale   SubCutaneous at bedtime  levothyroxine 25 MICROGram(s) Oral daily  lidocaine   Patch 1 Patch Transdermal daily  memantine 10 milliGRAM(s) Oral two times a day      VITALS:  T(F): 97.7 (18 @ 12:00), Max: 98.3 (18 @ 21:25)  HR: 60 (18 @ 12:00)  BP: 118/68 (18 @ 12:00)  RR: 18 (18 @ 12:00)  SpO2: 96% (18 @ 12:00)  Wt(kg): --     @ 07:01  -   @ 07:00  --------------------------------------------------------  IN: 830 mL / OUT: 0 mL / NET: 830 mL     @ 07:01  -   @ 13:38  --------------------------------------------------------  IN: 50 mL / OUT: 0 mL / NET: 50 mL        PHYSICAL EXAM:  Constitutional: NAD  HEENT: anicteric sclera, oropharynx clear, MMM  Neck: No JVD  Respiratory: CTAB, no wheezes, rales or rhonchi  Cardiovascular: S1, S2, RRR  Gastrointestinal: BS+, soft, NT/ND  Extremities: + peripheral edema      LABS:      144  |  109<H>  |  43<H>  ----------------------------<  119<H>  4.1   |  21<L>  |  2.28<H>    Ca    8.0<L>      2018 06:09  Phos  3.5     07-17  Mg     2.1     07-17      Creatinine Trend: 2.28 <--, 2.23 <--, 2.40 <--, 1.90 <--, 1.85 <--, 1.97 <--                        9.5    8.67  )-----------( 300      ( 2018 08:23 )             28.7     Urine Studies:  Urinalysis Basic - ( 2018 21:06 )    Color: Red / Appearance: Turbid / S.019 / pH:   Gluc:  / Ketone: Negative  / Bili: Negative / Urobili: Negative   Blood:  / Protein: 300 mg/dL / Nitrite: Negative   Leuk Esterase: Moderate / RBC: >50 /HPF / WBC 10-25 /HPF   Sq Epi:  / Non Sq Epi: Occasional /HPF / Bacteria: Moderate /HPF        RADIOLOGY & ADDITIONAL STUDIES:
Patient  seen and examined  no complaints    No Known Allergies    Hospital Medications:   MEDICATIONS  (STANDING):  amLODIPine   Tablet 10 milliGRAM(s) Oral daily  aspirin enteric coated 81 milliGRAM(s) Oral daily  atorvastatin 40 milliGRAM(s) Oral at bedtime  carvedilol 12.5 milliGRAM(s) Oral every 12 hours  cefTRIAXone   IVPB      cefTRIAXone   IVPB 1 Gram(s) IV Intermittent every 24 hours  citalopram 20 milliGRAM(s) Oral daily  clopidogrel Tablet 75 milliGRAM(s) Oral daily  dextrose 5%. 1000 milliLiter(s) (50 mL/Hr) IV Continuous <Continuous>  dextrose 50% Injectable 12.5 Gram(s) IV Push once  dextrose 50% Injectable 25 Gram(s) IV Push once  dextrose 50% Injectable 25 Gram(s) IV Push once  donepezil 10 milliGRAM(s) Oral at bedtime  fenofibrate Tablet 48 milliGRAM(s) Oral at bedtime  heparin  Injectable 5000 Unit(s) SubCutaneous every 12 hours  hydrALAZINE 25 milliGRAM(s) Oral <User Schedule>  hydrALAZINE 50 milliGRAM(s) Oral <User Schedule>  insulin glargine Injectable (LANTUS) 13 Unit(s) SubCutaneous <User Schedule>  insulin lispro (HumaLOG) corrective regimen sliding scale   SubCutaneous three times a day before meals  insulin lispro (HumaLOG) corrective regimen sliding scale   SubCutaneous at bedtime  levothyroxine 25 MICROGram(s) Oral daily  memantine 10 milliGRAM(s) Oral two times a day      VITALS:  T(F): 98.8 (18 @ 11:37), Max: 100.3 (07-15-18 @ 14:07)  HR: 62 (18 @ 11:37)  BP: 145/74 (18 @ 11:37)  RR: 18 (18 @ 11:37)  SpO2: 93% (18 @ 11:37)  Wt(kg): --    07-15 @ 07:01  -   @ 07:00  --------------------------------------------------------  IN: 480 mL / OUT: 0 mL / NET: 480 mL      Physical Exam :-  Constitutional: NAD  Neck: Supple.  Respiratory: Bilateral equal breath sounds, no Crackles present.  Cardiovascular: S1, S2 normal, positive Murmur  Gastrointestinal: Bowel Sounds present, soft, non tender.  Extremities: no Edema Feet  Neurological: Alert and Oriented x 3, no focal deficits  Psychiatric: Normal mood, normal affect    LABS:      142  |  106  |  34<H>  ----------------------------<  90  4.0   |  20<L>  |  2.40<H>    Ca    8.0<L>      2018 10:35  Phos  2.4     07-15  Mg     1.8     07-15    TPro  7.1  /  Alb  3.3  /  TBili  0.2  /  DBili      /  AST  17  /  ALT  8<L>  /  AlkPhos  63  07-14    Creatinine Trend: 2.40 <--, 1.90 <--, 1.85 <--, 1.97 <--                        10.0   12.1  )-----------( 237      ( 2018 10:34 )             29.7     Urine Studies:  Urinalysis Basic - ( 2018 21:06 )    Color: Red / Appearance: Turbid / S.019 / pH:   Gluc:  / Ketone: Negative  / Bili: Negative / Urobili: Negative   Blood:  / Protein: 300 mg/dL / Nitrite: Negative   Leuk Esterase: Moderate / RBC: >50 /HPF / WBC 10-25 /HPF   Sq Epi:  / Non Sq Epi: Occasional /HPF / Bacteria: Moderate /HPF        RADIOLOGY & ADDITIONAL STUDIES:

## 2018-07-19 NOTE — PROGRESS NOTE ADULT - ATTENDING COMMENTS
contact with question   cell [phone]  [facility]- [phone]
Plan d/w patient and family (daughter) at bedside. d/w NP (Tami) and cardiology fellow.    Fabiano Catalan M.D.  Hospitalist  Pager: [phone]
Anticipate discharge home today. Pt to f/u with PCP Dr. Isaiah Veliz, Nephrologist Dr. Joiner and cardiologist.     Discharge time spent 36 minutes
Plan d/w patient and family (daughter) at bedside. d/w NP (Chanda).    Fabiano Catalan M.D.  Hospitalist  Pager: [phone]

## 2018-07-19 NOTE — PROGRESS NOTE ADULT - PROBLEM SELECTOR PROBLEM 5
Type 2 diabetes mellitus with stage 4 chronic kidney disease, without long-term current use of insulin
Alzheimer's dementia without behavioral disturbance, unspecified timing of dementia onset
Alzheimer's dementia without behavioral disturbance, unspecified timing of dementia onset
CVA (cerebral vascular accident)
Type 2 diabetes mellitus with stage 4 chronic kidney disease, without long-term current use of insulin
Type 2 diabetes mellitus with stage 4 chronic kidney disease, without long-term current use of insulin

## 2018-07-19 NOTE — PHYSICAL THERAPY INITIAL EVALUATION ADULT - PHYSICAL ASSIST/NONPHYSICAL ASSIST: SCOOT/BRIDGE, REHAB EVAL
no dysuria, no frequency, and no hematuria. verbal cues/1 person assist/nonverbal cues (demo/gestures)

## 2018-07-19 NOTE — PROGRESS NOTE ADULT - PROBLEM SELECTOR PROBLEM 3
Stage 4 chronic kidney disease
HTN (hypertension)
Stage 4 chronic kidney disease
Type 2 diabetes mellitus with stage 4 chronic kidney disease, without long-term current use of insulin
HTN (hypertension)

## 2018-07-19 NOTE — PROGRESS NOTE ADULT - PROBLEM SELECTOR PROBLEM 4
Leg pain, anterior, right
Alzheimer's dementia without behavioral disturbance, unspecified timing of dementia onset
Leg pain, anterior, right
Leg pain, anterior, right
Type 2 diabetes mellitus with stage 4 chronic kidney disease, without long-term current use of insulin
Type 2 diabetes mellitus with stage 4 chronic kidney disease, without long-term current use of insulin

## 2018-07-19 NOTE — PHYSICAL THERAPY INITIAL EVALUATION ADULT - DISCHARGE DISPOSITION, PT EVAL
home w/ home PT/DC home with home PT services for general strengthening, to increase endurance, address fall prevention, perform balance training, safety assessment of home environment, and to restore pt's prior level of function, assist for mobility/ADLs, +HHA 24 hrs, owns W/C (per CM notes), owns rolling walker/cane, B[facility] [place] aware.

## 2018-07-19 NOTE — PROGRESS NOTE ADULT - PROBLEM SELECTOR PLAN 4
Pt with pain to medical aspect of anterior right leg. Has had for some time. Daughter concerned. Suspect likely musculoskeletal etiology.   - US lower extremity negative for DVT  - c/w Tylenol ATC for three days, Lidoderm patch to area
HbA1c 6.4. F/S controlled.  - c/w F/S and ISS
HbA1c 6.4. F/S controlled.  - c/w F/S and ISS
Patient at baseline, as per family at bedside.
Pt with pain to medical aspect of anterior right leg. Has had for some time. Daughter concerned. Suspect likely musculoskeletal etiology.   - Will check US leg  - Start Tylenol ATC for three days  - Lidoderm to area
Pt with pain to medical aspect of anterior right leg. Has had for some time. Daughter concerned. Suspect likely musculoskeletal etiology.   - Will check US leg  - c/w Tylenol ATC for three days, Lidoderm patch to area

## 2018-07-19 NOTE — PHYSICAL THERAPY INITIAL EVALUATION ADULT - ACTIVE RANGE OF MOTION EXAMINATION, REHAB EVAL
Left UE Active ROM was WFL (within functional limits)/Right LE Active ROM was WFL (within functional limits)/Right UE Active ROM was WFL (within functional limits)/Left LE Active ROM was WFL (within functional limits)

## 2018-07-19 NOTE — PROGRESS NOTE ADULT - PROBLEM SELECTOR PLAN 3
bp fluctuating  will monitor  c/w current meds
Cr remains stable.   - s/p mucomyst and IVF in setting of contrast with cath  - Trend BMP  - Avoid nephrotoxins  - Nephrology following
Cr remains stable.   - c/w mucomyst in setting of contrast with cath  - Trend BMP  - Avoid nephrotoxins  - f/u nephrology
Cr remains stable.   - s/p mucomyst and IVF in setting of contrast with cath  - Trend BMP  - Avoid nephrotoxins  - f/u nephrology
HbA1c 6.4. F/S controlled.  - c/w F/S and ISS
bp fluctuating  will monitor  c/w current meds
bp fluctuating  will monitor  if remains high then inc hydralazine to 50mg TID  trend bp
cont monitor blood pressure, blood pressure range , close monitor, hydralazine given to the pt, follow up with team

## 2018-07-19 NOTE — PROGRESS NOTE ADULT - ASSESSMENT
77 y/o woman w/ PMH HTN, HLD, DM2, CKD 4, CVA with residual mild right-sided weakness, anemia, and Alzheimer's dementia, JOAO no CPAP, and mod-severe AS admitted w/ dyspnea s/p cath w/ noted CAD s/p JOEL to RCA w/ course c/b UTI.
77 y/o woman w/ PMH HTN, HLD, DM2, CKD 4, CVA with residual mild right-sided weakness, anemia, and Alzheimer's dementia, JOAO no CPAP, and mod-severe AS admitted w/ dyspnea s/p cath w/ noted CAD s/p JOEL to RCA.
78 year old female hx of HTN DM CKD 4 b/l cr around 2.8mg/dl awaiting cardiac cath
78 year old female hx of HTN DM CKD 4 b/l cr around 2.8mg/dl s/p cardiac cath
78 year old female hx of HTN DM CKD 4 b/l cr around 2.8mg/dl s/p cardiac cath
79 y/o woman w/ PMH HTN, HLD, DM2, CKD 4, CVA with residual mild right-sided weakness, anemia, and Alzheimer's dementia, JOAO no CPAP, and mod-severe AS admitted w/ dyspnea s/p cath w/ noted CAD s/p JOEL to RCA w/ course c/b UTI.
79 y/o woman w/ PMH HTN, HLD, DM2, CKD 4, CVA with residual mild right-sided weakness, anemia, and Alzheimer's dementia, JOAO no CPAP, and mod-severe AS admitted w/ dyspnea s/p cath w/ noted CAD s/p JOEL to RCA w/ course c/b UTI.
78 year old female hx of HTN DM CKD 4 b/l cr around 2.8mg/dl s/p cardiac cath

## 2018-07-19 NOTE — PROGRESS NOTE ADULT - PROBLEM SELECTOR PLAN 1
Low grade temperature to 100.3 on 7/15 w/ new leukocytosis now downtrending. CXR w/ no consolidation. UA positive, Ucx with >100,000 E coli  - c/w Ceftriaxone. Completed course of abx  - blood cultures NGTD
Patient s/p JOEL to RCA without complication.  - Plan for staged PCI to LAD on Monday  - c/w DAPT, lipitor 40 mg qhs, and coreg
Patient s/p JOEL to RCA without complication.  - Plan for staged PCI to LAD today  - c/w DAPT, lipitor 40 mg qhs, and coreg
Patient s/p JOEL to RCA without complication.  - Plan for staged PCI to LAD tomorrow (d/w cardiology fellow)  - c/w DAPT, lipitor 40 mg qhs, and coreg
Patient s/p staged PCI to RCA and LAD. Tolerated procedures well  - c/w DAPT, lipitor 40 mg qhs, and coreg
Patient s/p staged PCI to RCA and LAD. Tolerated procedures well  - c/w DAPT, lipitor 40 mg qhs, and coreg
cr relatively stable  keep off nephrotoxins  trend bmp  trend urineoutput
cr relatively stable  keep off nephrotoxins  trend bmp  trend urineoutput
labs reviewed. Serum Creatinine 1.9, Volume Status : stable  plan for c cath,   mucomyst and iv fluid as on consult note  monitor Serum Creatinine
cr higher but at baseline  keep off nephrotoxins  trend bmp  trend urineoutput

## 2018-07-19 NOTE — PROGRESS NOTE ADULT - PROBLEM SELECTOR PROBLEM 1
Acute cystitis with hematuria
Chronic kidney disease (CKD), stage IV (severe)
Coronary artery disease involving native coronary artery of native heart without angina pectoris
Chronic kidney disease (CKD), stage IV (severe)

## 2018-07-19 NOTE — PHYSICAL THERAPY INITIAL EVALUATION ADULT - ADDITIONAL COMMENTS
Per Cm Notes, A&OX3, pt resides alone, uses [facility], and recieves 24hr JOSELINE [facility] via [facility] prior to admissions.

## 2018-07-19 NOTE — PHYSICAL THERAPY INITIAL EVALUATION ADULT - PRECAUTIONS/LIMITATIONS, REHAB EVAL
Pt wtih PMH HTN, HLD, DM2 (A1C unknown, managed by PCP), CKD 4 (nephrologist Dr. Joiner, Dr. Romo covering and notified), CVA with residual mild right-sided weakness, anemia, and Alzheimer's dementia (oriented to person and place), JOAO no CPAP. RLE duplex: (-) DVT Hospital course: Pt is s/p cath on 7/16 w/ noted CAD s/p JOEL to RCA w/ course c/b UTI. fall precautions/Pt wtih PMH HTN, HLD, DM2 (A1C unknown, managed by PCP), CKD 4 (nephrologist Dr. Joiner, Dr. Romo covering and notified), CVA with residual mild right-sided weakness, anemia, and Alzheimer's dementia (oriented to person and place), JOAO no CPAP. RLE duplex: (-) DVT Hospital course: Pt is s/p cath on 7/16 w/ noted CAD s/p JOEL to RCA w/ course c/b UTI.

## 2018-07-19 NOTE — PROGRESS NOTE ADULT - PROBLEM SELECTOR PLAN 2
Patient s/p staged PCI to RCA and LAD. Tolerated procedures well  - c/w DAPT, lipitor 40 mg qhs, and coreg
Cr slightly downtrended today. Nephrology f/u appreciated.  - c/w mucomyst in setting of contrast with cath  - Trend BMP  - Avoid nephrotoxinsq
Low grade temperature to 100.3 on 7/15 w/ new leukocytosis now downtrending. CXR w/ no consolidation. UA positive, Ucx with >100,000 E coli  - c/w Ceftriaxone (Day 3)  - blood cultures NGTD
Low grade temperature to 100.3 on 7/15 w/ new leukocytosis now downtrending. CXR w/ no consolidation. UA positive, Ucx with >100,000 E coli  - c/w Ceftriaxone. To complete course of treatement today  - blood cultures NGTD
Low grade temperature yesterday w/ new leukocytosis. CXR w/ no consolidation. UA positive, Ucx with >100,000 E coli  - c/w Ceftriaxone   - f/u blood cultures
Patient w/o dysuria, but has h/o recurrent UTI. Low grade temperature today w/ new leukocytosis this AM. CXR w/ no consolidation. No other focal complaints for other source of infection.   - Check blood cultures  - Start ceftriaxone 1 g IV daily  - f/u E. coli sensitivities
hb stable
hb stable  trend hgb

## 2018-07-19 NOTE — PROGRESS NOTE ADULT - PROBLEM SELECTOR PROBLEM 2
Coronary artery disease involving native coronary artery of native heart without angina pectoris
Acute cystitis with hematuria
Anemia
Stage 4 chronic kidney disease
Anemia

## 2018-07-19 NOTE — PHYSICAL THERAPY INITIAL EVALUATION ADULT - PERTINENT HX OF CURRENT PROBLEM, REHAB EVAL
Pt is a 79 y/o Frisian speaking female admitted to [facility] on 7/13/18 for cardiac cath. Pt wtih admission to [facility] in 3/2018 for c/o dyspnea, +CHF, and echo showed mod to severe AS.

## 2018-07-19 NOTE — PROGRESS NOTE ADULT - PROBLEM SELECTOR PLAN 5
HbA1c 6.4. F/S controlled.  - c/w F/S and ISS
Patient at baseline, as per family at bedside.
Patient at baseline, as per family at bedside.
c/w donepezil

## 2018-07-19 NOTE — PHYSICAL THERAPY INITIAL EVALUATION ADULT - GENERAL OBSERVATIONS, REHAB EVAL
Pt received supine, +Kyrgyz speaking, +IVL, +tele, +A&ox2 (self and place), hx dementia. +interpretor services utilized.

## 2018-07-20 LAB
CULTURE RESULTS: SIGNIFICANT CHANGE UP
CULTURE RESULTS: SIGNIFICANT CHANGE UP
SPECIMEN SOURCE: SIGNIFICANT CHANGE UP
SPECIMEN SOURCE: SIGNIFICANT CHANGE UP

## 2018-07-25 PROBLEM — G47.33 OBSTRUCTIVE SLEEP APNEA (ADULT) (PEDIATRIC): Chronic | Status: ACTIVE | Noted: 2018-07-13

## 2018-07-25 PROBLEM — F03.90 UNSPECIFIED DEMENTIA WITHOUT BEHAVIORAL DISTURBANCE: Chronic | Status: ACTIVE | Noted: 2017-10-01

## 2018-07-25 PROBLEM — N18.9 CHRONIC KIDNEY DISEASE, UNSPECIFIED: Chronic | Status: ACTIVE | Noted: 2017-10-01

## 2018-07-25 PROBLEM — E78.5 HYPERLIPIDEMIA, UNSPECIFIED: Chronic | Status: ACTIVE | Noted: 2017-10-01

## 2018-07-25 PROBLEM — I10 ESSENTIAL (PRIMARY) HYPERTENSION: Chronic | Status: ACTIVE | Noted: 2017-10-01

## 2018-07-25 PROBLEM — E03.9 HYPOTHYROIDISM, UNSPECIFIED: Chronic | Status: ACTIVE | Noted: 2018-07-13

## 2018-07-25 PROBLEM — M19.91 PRIMARY OSTEOARTHRITIS, UNSPECIFIED SITE: Chronic | Status: ACTIVE | Noted: 2018-07-13

## 2018-07-25 PROBLEM — E11.9 TYPE 2 DIABETES MELLITUS WITHOUT COMPLICATIONS: Chronic | Status: ACTIVE | Noted: 2017-10-01

## 2018-08-08 ENCOUNTER — APPOINTMENT (OUTPATIENT)
Dept: CARDIOLOGY | Facility: CLINIC | Age: 78
End: 2018-08-08
Payer: MEDICARE

## 2018-08-08 VITALS
HEIGHT: 59 IN | WEIGHT: 196 LBS | BODY MASS INDEX: 39.51 KG/M2 | OXYGEN SATURATION: 95 % | DIASTOLIC BLOOD PRESSURE: 76 MMHG | HEART RATE: 55 BPM | SYSTOLIC BLOOD PRESSURE: 168 MMHG

## 2018-08-08 PROCEDURE — 99214 OFFICE O/P EST MOD 30 MIN: CPT

## 2018-08-08 PROCEDURE — 93000 ELECTROCARDIOGRAM COMPLETE: CPT

## 2018-08-08 RX ORDER — MELOXICAM 7.5 MG/1
7.5 TABLET ORAL
Qty: 30 | Refills: 0 | Status: ACTIVE | COMMUNITY
Start: 2018-07-26

## 2018-08-08 RX ORDER — HYDRALAZINE HYDROCHLORIDE 50 MG/1
50 TABLET ORAL 3 TIMES DAILY
Qty: 90 | Refills: 3 | Status: ACTIVE | COMMUNITY
Start: 2017-10-26

## 2018-08-08 RX ORDER — HYDRALAZINE HYDROCHLORIDE 25 MG/1
25 TABLET ORAL
Qty: 90 | Refills: 0 | Status: DISCONTINUED | COMMUNITY
Start: 2018-04-03 | End: 2018-08-08

## 2018-08-08 RX ORDER — TRAMADOL HYDROCHLORIDE 50 MG/1
50 TABLET, COATED ORAL
Qty: 60 | Refills: 0 | Status: DISCONTINUED | COMMUNITY
Start: 2018-02-27 | End: 2018-08-08

## 2018-08-16 ENCOUNTER — MEDICATION RENEWAL (OUTPATIENT)
Age: 78
End: 2018-08-16

## 2019-03-20 ENCOUNTER — APPOINTMENT (OUTPATIENT)
Dept: CARDIOLOGY | Facility: CLINIC | Age: 79
End: 2019-03-20
Payer: MEDICARE

## 2019-03-20 ENCOUNTER — NON-APPOINTMENT (OUTPATIENT)
Age: 79
End: 2019-03-20

## 2019-03-20 VITALS
HEART RATE: 58 BPM | BODY MASS INDEX: 40.32 KG/M2 | OXYGEN SATURATION: 96 % | WEIGHT: 200 LBS | SYSTOLIC BLOOD PRESSURE: 188 MMHG | HEIGHT: 59 IN | DIASTOLIC BLOOD PRESSURE: 74 MMHG

## 2019-03-20 DIAGNOSIS — R07.9 CHEST PAIN, UNSPECIFIED: ICD-10-CM

## 2019-03-20 PROCEDURE — 99214 OFFICE O/P EST MOD 30 MIN: CPT

## 2019-03-20 PROCEDURE — 93000 ELECTROCARDIOGRAM COMPLETE: CPT

## 2019-03-20 NOTE — PHYSICAL EXAM
[General Appearance - Well Developed] : well developed [Normal Appearance] : normal appearance [Well Groomed] : well groomed [General Appearance - Well Nourished] : well nourished [No Deformities] : no deformities [General Appearance - In No Acute Distress] : no acute distress [Normal Conjunctiva] : the conjunctiva exhibited no abnormalities [Eyelids - No Xanthelasma] : the eyelids demonstrated no xanthelasmas [Normal Oral Mucosa] : normal oral mucosa [No Oral Pallor] : no oral pallor [No Oral Cyanosis] : no oral cyanosis [Normal Jugular Venous A Waves Present] : normal jugular venous A waves present [No Jugular Venous Diaz A Waves] : no jugular venous diaz A waves [Normal Jugular Venous V Waves Present] : normal jugular venous V waves present [Respiration, Rhythm And Depth] : normal respiratory rhythm and effort [Exaggerated Use Of Accessory Muscles For Inspiration] : no accessory muscle use [Heart Rate And Rhythm] : heart rate and rhythm were normal [Auscultation Breath Sounds / Voice Sounds] : lungs were clear to auscultation bilaterally [Murmurs] : no murmurs present [Heart Sounds] : normal S1 and S2 [Abdomen Soft] : soft [Abdomen Tenderness] : non-tender [Abdomen Mass (___ Cm)] : no abdominal mass palpated [Gait - Sufficient For Exercise Testing] : the gait was sufficient for exercise testing [Abnormal Walk] : normal gait [Nail Clubbing] : no clubbing of the fingernails [Cyanosis, Localized] : no localized cyanosis [Petechial Hemorrhages (___cm)] : no petechial hemorrhages [] : no ischemic changes

## 2019-03-20 NOTE — HISTORY OF PRESENT ILLNESS
[FreeTextEntry1] : Mrs. Gaxiola is a 78 year old female who came in for evaluation.  She was recently in [facility] for  CHF and was recommended for a cardiac cath.  An echo done showed moderate-severe aortic stenosis.    She has an exercise tolerance limited to osteoarthritis.     She had a cath which showed significant 2 vessel disease and is s/p stent to the rpl and lad.  No chest pain or sob.  Feels much better since her pci.  Has had no further muscle aches since stopping the atorvastatin.  Has not been walking much.  Her renal function is worsening.

## 2019-03-20 NOTE — REVIEW OF SYSTEMS
[Dyspnea on exertion] : not dyspnea during exertion [Shortness Of Breath] : no shortness of breath [Chest  Pressure] : no chest pressure [Chest Pain] : no chest pain [Negative] : Heme/Lymph

## 2019-07-12 ENCOUNTER — OUTPATIENT (OUTPATIENT)
Dept: OUTPATIENT SERVICES | Facility: HOSPITAL | Age: 79
LOS: 1 days | Discharge: HOME | End: 2019-07-12
Payer: MEDICARE

## 2019-07-12 DIAGNOSIS — Z98.891 HISTORY OF UTERINE SCAR FROM PREVIOUS SURGERY: Chronic | ICD-10-CM

## 2019-07-12 DIAGNOSIS — S62.92XA UNSPECIFIED FRACTURE OF LEFT WRIST AND HAND, INITIAL ENCOUNTER FOR CLOSED FRACTURE: ICD-10-CM

## 2019-07-12 DIAGNOSIS — Z98.890 OTHER SPECIFIED POSTPROCEDURAL STATES: Chronic | ICD-10-CM

## 2019-07-12 DIAGNOSIS — E89.0 POSTPROCEDURAL HYPOTHYROIDISM: Chronic | ICD-10-CM

## 2019-07-12 DIAGNOSIS — Z90.49 ACQUIRED ABSENCE OF OTHER SPECIFIED PARTS OF DIGESTIVE TRACT: Chronic | ICD-10-CM

## 2019-07-12 PROCEDURE — 73110 X-RAY EXAM OF WRIST: CPT | Mod: 26,LT

## 2019-07-13 ENCOUNTER — EMERGENCY (EMERGENCY)
Facility: HOSPITAL | Age: 79
LOS: 0 days | Discharge: HOME | End: 2019-07-13
Attending: EMERGENCY MEDICINE
Payer: MEDICARE

## 2019-07-13 VITALS
OXYGEN SATURATION: 98 % | HEIGHT: 61 IN | WEIGHT: 149.91 LBS | DIASTOLIC BLOOD PRESSURE: 57 MMHG | TEMPERATURE: 99 F | HEART RATE: 62 BPM | SYSTOLIC BLOOD PRESSURE: 114 MMHG | RESPIRATION RATE: 20 BRPM

## 2019-07-13 VITALS
HEART RATE: 63 BPM | SYSTOLIC BLOOD PRESSURE: 121 MMHG | TEMPERATURE: 98 F | RESPIRATION RATE: 18 BRPM | OXYGEN SATURATION: 96 % | DIASTOLIC BLOOD PRESSURE: 61 MMHG

## 2019-07-13 DIAGNOSIS — Z98.890 OTHER SPECIFIED POSTPROCEDURAL STATES: Chronic | ICD-10-CM

## 2019-07-13 DIAGNOSIS — E03.9 HYPOTHYROIDISM, UNSPECIFIED: ICD-10-CM
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
## 2019-07-13 DIAGNOSIS — N18.9 CHRONIC KIDNEY DISEASE, UNSPECIFIED: ICD-10-CM

## 2019-07-13 DIAGNOSIS — E78.5 HYPERLIPIDEMIA, UNSPECIFIED: ICD-10-CM

## 2019-07-13 DIAGNOSIS — E89.0 POSTPROCEDURAL HYPOTHYROIDISM: Chronic | ICD-10-CM

## 2019-07-13 DIAGNOSIS — D64.9 ANEMIA, UNSPECIFIED: ICD-10-CM

## 2019-07-13 DIAGNOSIS — Z98.891 HISTORY OF UTERINE SCAR FROM PREVIOUS SURGERY: Chronic | ICD-10-CM

## 2019-07-13 DIAGNOSIS — I10 ESSENTIAL (PRIMARY) HYPERTENSION: ICD-10-CM

## 2019-07-13 DIAGNOSIS — S52.592A OTHER FRACTURES OF LOWER END OF LEFT RADIUS, INITIAL ENCOUNTER FOR CLOSED FRACTURE: ICD-10-CM

## 2019-07-13 DIAGNOSIS — E11.9 TYPE 2 DIABETES MELLITUS WITHOUT COMPLICATIONS: ICD-10-CM

## 2019-07-13 DIAGNOSIS — Z90.49 ACQUIRED ABSENCE OF OTHER SPECIFIED PARTS OF DIGESTIVE TRACT: Chronic | ICD-10-CM

## 2019-07-13 DIAGNOSIS — G47.33 OBSTRUCTIVE SLEEP APNEA (ADULT) (PEDIATRIC): ICD-10-CM

## 2019-07-13 DIAGNOSIS — M25.532 PAIN IN LEFT WRIST: ICD-10-CM

## 2019-07-13 PROCEDURE — 73502 X-RAY EXAM HIP UNI 2-3 VIEWS: CPT | Mod: 26,LT

## 2019-07-13 PROCEDURE — 73110 X-RAY EXAM OF WRIST: CPT | Mod: 26,LT

## 2019-07-13 PROCEDURE — 73552 X-RAY EXAM OF FEMUR 2/>: CPT | Mod: 26,LT

## 2019-07-13 PROCEDURE — 25600 CLTX DST RDL FX/EPHYS SEP WO: CPT | Mod: 54

## 2019-07-13 PROCEDURE — 72170 X-RAY EXAM OF PELVIS: CPT | Mod: 26,59

## 2019-07-13 PROCEDURE — 99284 EMERGENCY DEPT VISIT MOD MDM: CPT | Mod: 25

## 2019-07-13 PROCEDURE — 73090 X-RAY EXAM OF FOREARM: CPT | Mod: 26,LT

## 2019-07-13 NOTE — ED PROVIDER NOTE - MUSCULOSKELETAL, MLM
Spine appears normal, range of motion is not limited, left wrist tenderness with swelling, there is ecchymosis to the left medial thigh.

## 2019-07-13 NOTE — ED ADULT NURSE NOTE - NSFALLRSKHARMRISK_ED_ALL_ED
Infectious Disease Consultation    Reason for Consult:  Evaluation of gram-negative bacillary septicemia occurring in a neutropenic lady    History of Present Illness:  This is a 62-year-old lady who has recently been diagnosed with a diffuse large B-cell lymphoma. She started R-CHOP chemotherapy on September 26, 2017. She tells me immediately afterwards she was nauseated fatigued she started having diarrhea until the point that she felt she was empty. She was not able to eat very well. She was very weak and had to urinate in garbage cans as she couldn't make it to the bathroom. She then developed temperatures up to 102°. She did receive G-CSF 2 days ago and again yesterday. When she came in to the clinic yesterday she had a temperature  To 102° generalized weakness and debility. She was sent to the emergency room where her temperature was up to 101.5°. She was significantly neutropenic with a white cell count of 200. Blood cultures were done and one of the blood cultures are growing out a gram-negative светлана. I been asked see and make further recommendations.    She complains of severe fatigue. She denies diarrhea today although had it yesterday. She is not vomiting but has no appetite. She gets short of breath when she exerts herself but is not short of breath at rest. She has no cough. She has no urinary symptoms. She has no joint related complaints. She has chronic abdominal pain related to the lymphoma and her colon.        Past Medical History:   Diagnosis Date   • Benign neoplasm of colon 8/16/07    tubulovillous adenoma   • Degeneration of lumbar or lumbosacral intervertebral disc    • Depression    • Diabetic eye exam 10/17/2008    mild hypertensive retinopathy, no DB retinopathy   • Diabetic neuropathy 4/22/2013   • DM (diabetes mellitus)    • Esophageal reflux    • Esophageal reflux 4/21/2015   • Essential hypertension, benign     Hypertension   • Fibromyalgia    • Hyperlipidemia    • Hypothyroidism    •  Impaired glucose tolerance test     impaired glucose tolerance   • Malignant neoplasm of thyroid gland 1984    subtotal resection and radiation RX and left parathyroids   • Mass of colon 2017   • Obesity, unspecified 10/3/2011   • Osteoarthrosis, unspecified whether generalized or localized, unspecified site    • 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   • Other specified gastritis without mention of hemorrhage 07    H Pylori negative   • Sebaceous cyst 2010   • Spinal stenosis, lumbar region, without neurogenic claudication 04       • Syncope and collapse     neurocardiogenic syncope   • Thyroid Cancer [facility]     solitary cold nodule left lobe papillary adenocarcioma one of 7 cervical nodes positive treated with radiation   • Type II or unspecified type diabetes mellitus without mention of complication, not stated as uncontrolled    • Unspecified hemorrhoids without mention of complication 6/14/10    internal        Past Surgical History:   Procedure Laterality Date   •  DELIVERY+POSTPARTUM CARE         • COLONOSCOPY DIAGNOSTIC  6/14/10 recall due 2015    Dr. Gomez   • COLONOSCOPY REMOVE LESION BY SNARE  07 recall due 2009    Dr. Gomez   • COLONOSCOPY REMOVE LESION BY SNARE  2017    Dr. Sosa, IP (Dr. Gomez Pt), Ileocecal Valve Mass, F/U 1 year   • COLONOSCOPY W BIOPSY  07    Dr. Gomez   • ESOPHAGOGASTRODUODENOSCOPY TRANSORAL FLEX W/BX SINGLE OR MULT  07    Dr. Gomez   • EXC SKIN BENIG 1.1-2CM TRUNK,ARM,LEG  09/15/2010    Dr. Chris Gaming   • EXCIS PRIMARY GANGLION WRIST  1970   • EXCISE LESION NECK CHEST < 5 CM  10/21/04    Excision of left neck mass 8 cm x 10cm- Dr.Basil Gaming   • FINGER MASS EXCISION  4/10/2014    Fibroma of tendon sheath   • INJ TRANSFORAM EPIDURAL LUMBAR/SACRAL  04       • INJ TRANSFORAM EPIDURAL LUMBAR/SACRAL     • LAPAROSCOPY, APPENDECTOMY  2011     Dr. NEIDA Rene ([facility])   • PAST SURGICAL HISTORY  about 1982 @ [facility]    Lump removed from breast   • REMOVE TONSILS/ADENOIDS,<9 Y/O     • REPAIR INTERCARP/CARP-METACARP JT  6/29/07    R thumb cmc arthroplasty by Dr. ROSS   • THYROIDECTOMY  6/7/84    Left thyroid lobectomy,near total right thyroid lobectomy  and modified radical  neck, left neck- Dr. Alber Parham        ALLERGIES:  Dye [contrast media]; Morphine; Duragesic disc transdermal system [fentanyl tdsy]; Betadine [povidone iodine]; Codeine sulfate; Erythromycin base; Glucosamine hcl-glucosamine s04; Hexachlorophene; Iodine; Meperidine and related; Opioid analgesics; Oxycodone hcl; Penicillins; Prandin [repaglinide]; Propoxyphene and methadone; Propoxyphene and methadone; Sulfa antibiotics; Tetracycline; Valium [diazepam]; Vicodin [hydrocodone-acetaminophen]; and Vicodin [other]     Current Facility-Administered Medications   Medication Dose Route Frequency Provider Last Rate Last Dose   • vancomycin 1,250 mg in sodium chloride 0.9% 250 mL IVPB  1,250 mg Intravenous 2 times per day Eddie Garcia MD       • atenolol (TENORMIN) tablet 50 mg  50 mg Oral Daily Dayami Horner MD   50 mg at 03/08/17 0936   • ketotifen (ZADITOR) 0.025 % ophthalmic solution 1 drop  1 drop Both Eyes Daily PRN Dayami Horner MD       • calcitRIOL (ROCALTROL) capsule 0.5 mcg  0.5 mcg Oral BID Dayami Horner MD   0.5 mcg at 03/08/17 0937   • diclofenac (VOLTAREN) 1 % gel 2 g  2 g Topical 4x Daily PRN Dayami Horner MD       • hydrOXYzine (ATARAX) tablet 50 mg  50 mg Oral Daily PRN Dayami Horner MD       • prochlorperazine (COMPAZINE) tablet 10 mg  10 mg Oral Q6H PRN Dayami Horner MD       • simvastatin (ZOCOR) tablet 10 mg  10 mg Oral Nightly Dayami Horner MD   10 mg at 03/07/17 2497   • levothyroxine (*SYNTHROID brand*) tablet 150 mcg  150 mcg Oral KARRIE Horner MD   150 mcg at 03/08/17 0717   • sodium  chloride (PF) 0.9 % injection 2 mL  2 mL Injection 2 times per day Dayami Horner MD       • sodium chloride (PF) 0.9 % injection 2 mL  2 mL Injection PRN Dayami Horner MD       • sodium chloride (NORMAL SALINE) 0.9 % bolus 500 mL  500 mL Intravenous PRN Dayami Horner MD       • nitroGLYcerin (NITROSTAT) sublingual tablet 0.4 mg  0.4 mg Sublingual Q5 Min PRN Dayami Horner MD       • sodium chloride 0.9% infusion   Intravenous Continuous Dayami Horner  mL/hr at 03/08/17 0904     • VANCOMYCIN - PHARMACIST MONITORED   Does not apply See Admin Instructions Dayami Horner MD       • meropenem (MERREM) 1,000 mg in sodium chloride 0.9 % 100 mL IVPB  1,000 mg Intravenous 3 times per day Dayami Horner MD 33.3 mL/hr at 03/08/17 1056 1,000 mg at 03/08/17 1056   • acetaminophen (TYLENOL) tablet 650 mg  650 mg Oral Q4H PRN Dayami Horner MD   650 mg at 03/08/17 0714   • dextrose 50 % injection 25 g  25 g Intravenous PRN Dayami Horner MD       • dextrose 5 % infusion   Intravenous Continuous PRN Dayami Horner MD       • glucagon (GLUCAGEN) injection 1 mg  1 mg Intramuscular PRN Dayami Horner MD       • dextrose (GLUTOSE) 40 % gel 15 g  15 g Oral PRN Dayami Horner MD       • insulin lispro (HumaLOG) sliding scale injection   Subcutaneous TID AC Dayami Horner MD   2 Units at 03/08/17 0842   • potassium chloride (K-DUR,KLOR-CON) CR tablet 20 mEq  20 mEq Oral Q4H PRN Dayami oHrner MD       • potassium chloride (KLOR-CON) packet 20 mEq  20 mEq Per NG tube Q4H PRN Dayami Horner MD       • potassium chloride 20 mEq/100mL IVPB premix  20 mEq Intravenous Q4H PRN Dayami Horner MD       • potassium chloride (K-DUR,KLOR-CON) CR tablet 40 mEq  40 mEq Oral Q4H PRN Dayami Horner MD       • potassium chloride (KLOR-CON) packet 40 mEq  40 mEq Per NG tube Q4H PRN Dayami Horner MD        • potassium chloride 20 mEq/100mL IVPB premix  40 mEq Intravenous Q4H PRN Dayami Horner MD       • magnesium sulfate 1 g in dextrose 5% 100 mL IVPB premix  1 g Intravenous Daily PRN Dayami Horner MD       • magnesium sulfate 2 g in sodium chloride 0.9% IVPB  2 g Intravenous Daily PRN Dayami Horner  mL/hr at 03/08/17 0937 2 g at 03/08/17 0937   • magnesium sulfate 2 g in sodium chloride 0.9% IVPB  2 g Intravenous Q4H PRN Dayami Horner MD       • filgrastim-sndz (ZARXIO) injection 480 mcg  5 mcg/kg Subcutaneous Daily at noon Dayami Horner MD   480 mcg at 03/08/17 1335         Social History     Social History   • Marital status:      Spouse name: N/A   • Number of children: N/A   • Years of education: N/A     Occupational History   • disability [employer]     Social History Main Topics   • Smoking status: Never Smoker   • Smokeless tobacco: Never Used   • Alcohol use 0.0 oz/week     0 Standard drinks or equivalent per week      Comment: rarely   • Drug use: No   • Sexual activity: Not Currently     Other Topics Concern   • Not on file     Social History Narrative        Family History   Problem Relation Age of Onset   • Hypertension Mother    • Hypertension Father    • Stroke Father    • Respiratory Father      lung disease   • Lipids Father    • Heart failure Father      chf   • Colon Polyps Brother    • Cancer Maternal Grandmother      unknown   • Hypertension Paternal Aunt      times 2   • Hypertension Brother    • Cancer Paternal Aunt      skin cancer   • Cancer Sister      Thyroid            Review of systems is negative other than above.    Exam:  Visit Vitals   • /74 ([place] [facility] [place]: Semi-Gunderson's)   • Pulse 96   • Temp 97.9 °F (36.6 °C) (Oral)   • Resp 18   • Ht 5' 9\" (1.753 m)   • Wt 108.9 kg   • LMP 09/29/2005   • SpO2 96%   • BMI 35.45 kg/m2      Gen. appearance is that of a very weak 62-year-old lady. She just  yes appears washed out. She is able to open her eyes and respond appropriately to questions. She does not like to move very much.  Skin: No rash  HEENT: No thrush no cervical adenopathy  Lungs: Decreased breath sounds at the bases otherwise clear  Heart: Normal S1 and S2  Abdomen: Soft some mild diffuse tenderness in the mid abdomen no masses are appreciated  Extremities negative.        Recent Labs  Lab 03/08/17  0650 03/07/17  1640 03/06/17  1009   WBC 0.2* 0.2* 0.1*   RBC 3.10* 3.56* 3.80*   HGB 8.6* 9.8* 10.3*   HCT 25.7* 29.5* 31.8*   MCV 82.9 82.9 83.7   MCH 27.7 27.5 27.1   MCHC 33.5 33.2 32.4   PLT 74* 97* 109*         Recent Labs  Lab 03/08/17  0650 03/07/17  1640 03/06/17  1010 03/03/17  0845   CALCIUM 6.9*  --  7.9* 6.9*   BUN 11  --  15 22*   ALBUMIN 1.8* 2.2* 2.5*  --    AST 14 10 14  --    CO2 24  --  30 31   CREATININE 0.83  --  1.00* 0.88   ANIONGAP 16  --  12 7*           Impression:  1. Gram-negative septicemia occurring in a neutropenic lady with underlying B-cell lymphoma. In general the source for this is usually translocation from intestinal carla. I cannot rule out a urinary etiology  2. Diffuse large B-cell lymphoma diagnosed last month. She has completed her first course of R-CHOP on February 26.  3. Neutropenia related to #2  4. Multiple drug allergies and adverse reactions      Recommendations:  1. For now we'll continue the vancomycin and the meropenem however if the cultures are negative for any gram-positive organisms I will stop the vancomycin tomorrow.  2. Await results of blood cultures  3. Further recommendations based on her clinical course      Thank you for this consultation!  Will follow    Eddie Garcia M.D.  Infectious Diseases and Hyperbaric Medicine

## 2019-07-13 NOTE — ED ADULT NURSE NOTE - PSH
H/O cardiac catheterization    H/O cataract extraction    H/O thyroidectomy  at age 16yo in [facility] ?due to mass?  History of     History of cholecystectomy    History of thyroidectomy

## 2019-07-13 NOTE — ED PROVIDER NOTE - OBJECTIVE STATEMENT
79 year old female that has dementia and has a medical 79 year old female that has dementia and had a mechanical fall yesterday was sent to the ED because of left wrist pain. Her grandaughter is here and says that she is mentally at her baseline. She is complaining of pain tot he left wrist and left femur. No headache, no neck pain, no chest pain, no sob, no nausea, no vomiting.

## 2019-07-13 NOTE — ED ADULT NURSE NOTE - NSIMPLEMENTINTERV_GEN_ALL_ED
Implemented All Fall with Harm Risk Interventions:  [place] to call system. Call bell, personal items and telephone within reach. Instruct patient to call for assistance. Room bathroom lighting operational. Non-slip footwear when patient is off stretcher. Physically safe environment: no spills, clutter or unnecessary equipment. Stretcher in lowest position, wheels locked, appropriate side rails in place. Provide visual cue, wrist band, yellow gown, etc. Monitor gait and stability. Monitor for mental status changes and reorient to person, place, and time. Review medications for side effects contributing to fall risk. Reinforce activity limits and safety measures with patient and family. Provide visual clues: red socks.

## 2019-07-26 ENCOUNTER — OUTPATIENT (OUTPATIENT)
Dept: OUTPATIENT SERVICES | Facility: HOSPITAL | Age: 79
LOS: 1 days | Discharge: HOME | End: 2019-07-26
Payer: MEDICARE

## 2019-07-26 DIAGNOSIS — N39.0 URINARY TRACT INFECTION, SITE NOT SPECIFIED: ICD-10-CM

## 2019-07-26 DIAGNOSIS — E89.0 POSTPROCEDURAL HYPOTHYROIDISM: Chronic | ICD-10-CM

## 2019-07-26 DIAGNOSIS — Z98.890 OTHER SPECIFIED POSTPROCEDURAL STATES: Chronic | ICD-10-CM

## 2019-07-26 DIAGNOSIS — Z90.49 ACQUIRED ABSENCE OF OTHER SPECIFIED PARTS OF DIGESTIVE TRACT: Chronic | ICD-10-CM

## 2019-07-26 DIAGNOSIS — Z98.891 HISTORY OF UTERINE SCAR FROM PREVIOUS SURGERY: Chronic | ICD-10-CM

## 2019-07-26 PROCEDURE — 76775 US EXAM ABDO BACK WALL LIM: CPT | Mod: 26

## 2019-08-06 ENCOUNTER — RX RENEWAL (OUTPATIENT)
Age: 79
End: 2019-08-06

## 2019-08-15 ENCOUNTER — APPOINTMENT (OUTPATIENT)
Dept: UROLOGY | Facility: CLINIC | Age: 79
End: 2019-08-15
Payer: MEDICARE

## 2019-08-15 VITALS
HEART RATE: 58 BPM | BODY MASS INDEX: 40.32 KG/M2 | DIASTOLIC BLOOD PRESSURE: 53 MMHG | WEIGHT: 200 LBS | HEIGHT: 59 IN | SYSTOLIC BLOOD PRESSURE: 134 MMHG

## 2019-08-15 DIAGNOSIS — R32 UNSPECIFIED URINARY INCONTINENCE: ICD-10-CM

## 2019-08-15 DIAGNOSIS — N39.0 URINARY TRACT INFECTION, SITE NOT SPECIFIED: ICD-10-CM

## 2019-08-15 DIAGNOSIS — N20.0 CALCULUS OF KIDNEY: ICD-10-CM

## 2019-08-15 DIAGNOSIS — E11.9 TYPE 2 DIABETES MELLITUS W/OUT COMPLICATIONS: ICD-10-CM

## 2019-08-15 DIAGNOSIS — F03.90 UNSPECIFIED DEMENTIA W/OUT BEHAVIORAL DISTURBANCE: ICD-10-CM

## 2019-08-15 PROCEDURE — 99205 OFFICE O/P NEW HI 60 MIN: CPT

## 2019-08-16 NOTE — REASON FOR VISIT
[Initial Visit ___] : [unfilled] is here today for an initial visit  for [unfilled] [Formal Caregiver] : formal caregiver [Family Member] : family member

## 2019-08-17 PROBLEM — N39.0 RECURRENT UTI: Status: ACTIVE | Noted: 2019-08-17

## 2019-08-17 PROBLEM — R32 INCONTINENCE IN FEMALE: Status: ACTIVE | Noted: 2019-08-17

## 2019-08-17 PROBLEM — N20.0 NEPHROLITHIASIS: Status: ACTIVE | Noted: 2019-08-17

## 2019-08-17 PROBLEM — F03.90 DEMENTIA: Status: ACTIVE | Noted: 2019-08-17

## 2019-08-17 NOTE — ASSESSMENT
[FreeTextEntry1] : 78 yo F with recurrent uti vs chronic bacteruria, history of nephrolithiasis\par \par - Reviewed recent US and labwork\par - Obtain records from [facility] and [facility]\par - discussed possible etiologies for UTI. Spent extensive period of time discussed behavioral modification including adequate hydration, cutting back on caffeine intake, timed voiding during the day, importance of controlling any diabetes and chronic constipation and how all of these things could potentially increase risk for persistent or recurrent UTI. For her, also specifically focused on improtance of changing pull-ups when wet\par - Spent extensive period of time also discussing how UA should only be sent if pt is having symptoms. Would not treat positive cultures unless symptomatic.\par - FU in 3 months

## 2019-08-17 NOTE — REVIEW OF SYSTEMS
[see HPI] : see HPI [Confused] : confusion [Feelings Of Weakness] : feelings of weakness [Negative] : Heme/Lymph

## 2019-08-17 NOTE — HISTORY OF PRESENT ILLNESS
[FreeTextEntry1] : 80 yo [place] speaking F presents with history of nephrolithiasis\par Per daughter pt underwent stent placement in May for septic stone at [facility]\par Due to insurance issues, pt ended up going to [facility] for URS.\par Apparently after the procedure, pt developed sepsis again and was in the ICU. Per daughter, had issues with candida infection of the brain (?)\par Recently hospitalized after a fall and discharged to rehab facility\par At rehab facility, found to have positive cultures. Daughter unsure if pt was having symptoms at the time\par Renal US showed a small nonobstructing stone\par Drinks 4 cups of water daily\par Is incontinent and wears diapers. Pt has habit of refusing to change even when wet and will sometimes get belligerent with aide when trying to change\par Normal bowel movements

## 2019-08-17 NOTE — PHYSICAL EXAM
[General Appearance - Well Developed] : well developed [Normal Appearance] : normal appearance [General Appearance - Well Nourished] : well nourished [General Appearance - In No Acute Distress] : no acute distress [Well Groomed] : well groomed [Edema] : no peripheral edema [Respiration, Rhythm And Depth] : normal respiratory rhythm and effort [Exaggerated Use Of Accessory Muscles For Inspiration] : no accessory muscle use [Abdomen Tenderness] : non-tender [Abdomen Soft] : soft [Costovertebral Angle Tenderness] : no ~M costovertebral angle tenderness [Urinary Bladder Findings] : the bladder was normal on palpation [FreeTextEntry1] : wheelchair bound [No Focal Deficits] : no focal deficits [] : no rash [Affect] : the affect was normal [Not Anxious] : not anxious [No Palpable Adenopathy] : no palpable adenopathy [Mood] : the mood was normal

## 2019-08-29 NOTE — PHYSICAL THERAPY INITIAL EVALUATION ADULT - BED MOBILITY LIMITATIONS, REHAB EVAL
decreased ability to use arms for pushing/pulling/decreased ability to use legs for bridging/pushing
negative

## 2019-12-30 NOTE — ED ADULT TRIAGE NOTE - CHIEF COMPLAINT QUOTE
Fell backwards attempting to sit on walker and witnesses say she passed out, hit head against shelf Oxybutynin Counseling:  I discussed with the patient the risks of oxybutynin including but not limited to skin rash, drowsiness, dry mouth, difficulty urinating, and blurred vision.

## 2020-01-31 ENCOUNTER — RX RENEWAL (OUTPATIENT)
Age: 80
End: 2020-01-31

## 2020-02-24 ENCOUNTER — RX RENEWAL (OUTPATIENT)
Age: 80
End: 2020-02-24

## 2020-02-24 RX ORDER — CLOPIDOGREL BISULFATE 75 MG/1
75 TABLET, FILM COATED ORAL DAILY
Qty: 90 | Refills: 0 | Status: ACTIVE | COMMUNITY
Start: 2019-08-06 | End: 1900-01-01

## 2023-03-21 NOTE — ED ADULT NURSE NOTE - CAS DISCH CONDITION
Called patient and relayed provider's message. Patient stated understanding and will  the medication at her pharmacy. Patient had no further questions at this time.    Cony JHA RN  [facility] Triage Team     Stable

## 2025-06-03 NOTE — PHYSICAL THERAPY INITIAL EVALUATION ADULT - NAME OF DISCHARGE PLANNER
COLLATERAL     ========================     NAME: Bruce Solorio     NUMBER: [phone]     RELATIONSHIP:      RELIABILITY: reliable     COMMENTS:           ========================     HPI     ========================     BASELINE FUNCTIONING: Pt is a 60 y/o female, on disability, domiciled with  and adult daughter. Pt has a PPHx of MDD, SIMIN, panic attacks, PMH of DM and fibromyalgia. Pt is currently connected with outpatient psychiatric care. No previous IPP hospitalizations. At baseline, pt is able to care for herself and manage her ADLs. Pt also assists in caring for her elderly parents. Per , pt is social and engages in regular family activities.      DATE HPI STARTED: 6/3/2025     DECOMPENSATION: Pt’s  was not aware that she presented to the ER.  reports that pt has been depressed lately, related to ongoing marital issues.  reports that pt told him yesterday she was feeling overwhelmed. Per , pt sometimes will isolate from the family when she is feeling sad and depressed.  did not report any new changes in pt’s behavior.      SUICIDALITY: No SI or SA reported     VIOLENCE: No HI or aggressive behavior reported     SUBSTANCE: None reported                ========================     PAST PSYCHIATRIC HISTORY     ========================     DATE PAST PSYCHIATRIC HISTORY STARTED: Unknown     MAIN PSYCHIATRIC DIAGNOSIS: MDD, SIMIN, panic attacks     PSYCHIATRIC HOSPITALIZATIONS: No previous hospitalizations?     PRIOR ILLNESS: treatment for panic attacks while in [facility] in Sept 2024 – Oct 2024     SUICIDALITY: None reported?     VIOLENCE: None reported     SUBSTANCE USE: None reported?          ==============     OTHER HISTORY     ==============     CURRENT MEDICATION: Xanax, Prozac, Gabapentin – unsure of dosages?     MEDICAL HISTORY: fibromyalgia, diabetes     ALLERGIES: None reported     LEGAL ISSUES: None reported     FIREARM ACCESS: No access to firearms or weapons     SOCIAL HISTORY: None reported     FAMILY HISTORY: None reported     DEVELOPMENTAL HISTORY: None reported
maren hugo